# Patient Record
Sex: MALE | Race: BLACK OR AFRICAN AMERICAN | NOT HISPANIC OR LATINO
[De-identification: names, ages, dates, MRNs, and addresses within clinical notes are randomized per-mention and may not be internally consistent; named-entity substitution may affect disease eponyms.]

---

## 2018-01-02 ENCOUNTER — LABORATORY RESULT (OUTPATIENT)
Age: 68
End: 2018-01-02

## 2018-01-04 ENCOUNTER — APPOINTMENT (OUTPATIENT)
Dept: NEPHROLOGY | Facility: CLINIC | Age: 68
End: 2018-01-04
Payer: COMMERCIAL

## 2018-01-04 VITALS — SYSTOLIC BLOOD PRESSURE: 130 MMHG | HEART RATE: 72 BPM | DIASTOLIC BLOOD PRESSURE: 68 MMHG

## 2018-01-04 DIAGNOSIS — Z78.9 OTHER SPECIFIED HEALTH STATUS: ICD-10-CM

## 2018-01-04 PROCEDURE — 99214 OFFICE O/P EST MOD 30 MIN: CPT

## 2018-01-04 RX ORDER — ASPIRIN ENTERIC COATED TABLETS 81 MG 81 MG/1
81 TABLET, DELAYED RELEASE ORAL DAILY
Refills: 0 | Status: ACTIVE | COMMUNITY
Start: 2018-01-04

## 2018-01-04 RX ORDER — LOSARTAN POTASSIUM 100 MG/1
100 TABLET, FILM COATED ORAL DAILY
Qty: 30 | Refills: 5 | Status: ACTIVE | COMMUNITY
Start: 2018-01-04

## 2018-01-04 RX ORDER — ATORVASTATIN CALCIUM 10 MG/1
10 TABLET, FILM COATED ORAL DAILY
Qty: 30 | Refills: 3 | Status: ACTIVE | COMMUNITY
Start: 2018-01-04

## 2018-01-04 RX ORDER — METOPROLOL TARTRATE 25 MG/1
25 TABLET, FILM COATED ORAL TWICE DAILY
Qty: 60 | Refills: 5 | Status: ACTIVE | COMMUNITY
Start: 2018-01-04

## 2018-06-29 LAB
APPEARANCE: CLEAR
BACTERIA: NEGATIVE
BASOPHILS # BLD AUTO: 0.01 K/UL
BASOPHILS NFR BLD AUTO: 0.2 %
BILIRUBIN URINE: NEGATIVE
BLOOD URINE: NEGATIVE
COLOR: YELLOW
EOSINOPHIL # BLD AUTO: 0.07 K/UL
EOSINOPHIL NFR BLD AUTO: 1.2 %
GLUCOSE QUALITATIVE U: NEGATIVE MG/DL
HCT VFR BLD CALC: 40.4 %
HGB BLD-MCNC: 12.6 G/DL
IMM GRANULOCYTES NFR BLD AUTO: 0.2 %
KETONES URINE: NEGATIVE
LEUKOCYTE ESTERASE URINE: NEGATIVE
LYMPHOCYTES # BLD AUTO: 1.69 K/UL
LYMPHOCYTES NFR BLD AUTO: 28.9 %
MAN DIFF?: NORMAL
MCHC RBC-ENTMCNC: 29.5 PG
MCHC RBC-ENTMCNC: 31.2 GM/DL
MCV RBC AUTO: 94.6 FL
MICROSCOPIC-UA: NORMAL
MONOCYTES # BLD AUTO: 0.5 K/UL
MONOCYTES NFR BLD AUTO: 8.5 %
NEUTROPHILS # BLD AUTO: 3.57 K/UL
NEUTROPHILS NFR BLD AUTO: 61 %
NITRITE URINE: NEGATIVE
PH URINE: 5.5
PLATELET # BLD AUTO: 180 K/UL
PROTEIN URINE: ABNORMAL MG/DL
RBC # BLD: 4.27 M/UL
RBC # FLD: 14 %
RED BLOOD CELLS URINE: 3 /HPF
SPECIFIC GRAVITY URINE: 1.02
SQUAMOUS EPITHELIAL CELLS: 0 /HPF
UROBILINOGEN URINE: NEGATIVE MG/DL
WBC # FLD AUTO: 5.85 K/UL
WHITE BLOOD CELLS URINE: 0 /HPF

## 2018-07-01 LAB
25(OH)D3 SERPL-MCNC: 26.1 NG/ML
ALBUMIN SERPL ELPH-MCNC: 4.1 G/DL
ALP BLD-CCNC: 105 U/L
ALT SERPL-CCNC: 23 U/L
ANION GAP SERPL CALC-SCNC: 15 MMOL/L
AST SERPL-CCNC: 20 U/L
BILIRUB SERPL-MCNC: 0.2 MG/DL
BUN SERPL-MCNC: 29 MG/DL
CALCIUM SERPL-MCNC: 9.9 MG/DL
CALCIUM SERPL-MCNC: 9.9 MG/DL
CHLORIDE SERPL-SCNC: 107 MMOL/L
CO2 SERPL-SCNC: 23 MMOL/L
CREAT SERPL-MCNC: 1.58 MG/DL
CREAT SPEC-SCNC: 116 MG/DL
CREAT/PROT UR: 0.1 RATIO
GLUCOSE SERPL-MCNC: 53 MG/DL
MAGNESIUM SERPL-MCNC: 2.1 MG/DL
PARATHYROID HORMONE INTACT: 78 PG/ML
PHOSPHATE SERPL-MCNC: 4.2 MG/DL
POTASSIUM SERPL-SCNC: 4.2 MMOL/L
PROT SERPL-MCNC: 7.2 G/DL
PROT UR-MCNC: 14 MG/DL
SODIUM SERPL-SCNC: 145 MMOL/L
URATE SERPL-MCNC: 5.6 MG/DL

## 2018-07-02 ENCOUNTER — APPOINTMENT (OUTPATIENT)
Dept: NEPHROLOGY | Facility: CLINIC | Age: 68
End: 2018-07-02
Payer: COMMERCIAL

## 2018-07-02 VITALS
DIASTOLIC BLOOD PRESSURE: 78 MMHG | HEART RATE: 60 BPM | SYSTOLIC BLOOD PRESSURE: 146 MMHG | HEIGHT: 66 IN | BODY MASS INDEX: 31.5 KG/M2 | WEIGHT: 196 LBS

## 2018-07-02 PROCEDURE — 99214 OFFICE O/P EST MOD 30 MIN: CPT

## 2018-09-06 ENCOUNTER — APPOINTMENT (OUTPATIENT)
Dept: NEPHROLOGY | Facility: CLINIC | Age: 68
End: 2018-09-06

## 2018-09-14 LAB
ALBUMIN SERPL ELPH-MCNC: 4.2 G/DL
ALP BLD-CCNC: 105 U/L
ALT SERPL-CCNC: 21 U/L
ANION GAP SERPL CALC-SCNC: 13 MMOL/L
AST SERPL-CCNC: 25 U/L
BILIRUB SERPL-MCNC: 0.2 MG/DL
BUN SERPL-MCNC: 36 MG/DL
CALCIUM SERPL-MCNC: 10 MG/DL
CHLORIDE SERPL-SCNC: 102 MMOL/L
CO2 SERPL-SCNC: 26 MMOL/L
CREAT SERPL-MCNC: 1.57 MG/DL
GLUCOSE SERPL-MCNC: 82 MG/DL
MAGNESIUM SERPL-MCNC: 1.9 MG/DL
PHOSPHATE SERPL-MCNC: 3.3 MG/DL
POTASSIUM SERPL-SCNC: 4.2 MMOL/L
PROT SERPL-MCNC: 7.8 G/DL
SODIUM SERPL-SCNC: 141 MMOL/L
URATE SERPL-MCNC: 5.4 MG/DL

## 2018-09-24 ENCOUNTER — APPOINTMENT (OUTPATIENT)
Dept: VASCULAR SURGERY | Facility: CLINIC | Age: 68
End: 2018-09-24
Payer: COMMERCIAL

## 2018-09-24 PROCEDURE — 99213 OFFICE O/P EST LOW 20 MIN: CPT

## 2018-10-04 ENCOUNTER — APPOINTMENT (OUTPATIENT)
Dept: NEPHROLOGY | Facility: CLINIC | Age: 68
End: 2018-10-04
Payer: COMMERCIAL

## 2018-10-04 VITALS
DIASTOLIC BLOOD PRESSURE: 70 MMHG | HEART RATE: 60 BPM | SYSTOLIC BLOOD PRESSURE: 138 MMHG | WEIGHT: 199 LBS | BODY MASS INDEX: 32.12 KG/M2

## 2018-10-04 PROCEDURE — 99214 OFFICE O/P EST MOD 30 MIN: CPT

## 2018-10-04 RX ORDER — SPIRONOLACTONE 25 MG/1
25 TABLET ORAL DAILY
Qty: 90 | Refills: 3 | Status: ACTIVE | COMMUNITY
Start: 2018-10-04 | End: 1900-01-01

## 2018-10-04 RX ORDER — SPIRONOLACTONE 25 MG/1
25 TABLET ORAL DAILY
Qty: 30 | Refills: 5 | Status: DISCONTINUED | COMMUNITY
Start: 2018-01-04 | End: 2018-10-04

## 2018-10-04 RX ORDER — METOLAZONE 2.5 MG/1
2.5 TABLET ORAL DAILY
Qty: 90 | Refills: 3 | Status: ACTIVE | COMMUNITY
Start: 2018-07-02 | End: 1900-01-01

## 2018-10-04 RX ORDER — FUROSEMIDE 40 MG/1
40 TABLET ORAL
Qty: 30 | Refills: 0 | Status: DISCONTINUED | COMMUNITY
Start: 2018-01-04 | End: 2018-10-04

## 2018-10-04 RX ORDER — FUROSEMIDE 40 MG/1
40 TABLET ORAL DAILY
Qty: 90 | Refills: 3 | Status: ACTIVE | COMMUNITY
Start: 2018-10-04 | End: 1900-01-01

## 2018-10-11 ENCOUNTER — APPOINTMENT (OUTPATIENT)
Dept: ENDOCRINOLOGY | Facility: CLINIC | Age: 68
End: 2018-10-11
Payer: COMMERCIAL

## 2018-10-11 VITALS
BODY MASS INDEX: 31.34 KG/M2 | WEIGHT: 195 LBS | HEART RATE: 57 BPM | SYSTOLIC BLOOD PRESSURE: 134 MMHG | HEIGHT: 66 IN | DIASTOLIC BLOOD PRESSURE: 70 MMHG

## 2018-10-11 DIAGNOSIS — E11.9 TYPE 2 DIABETES MELLITUS W/OUT COMPLICATIONS: ICD-10-CM

## 2018-10-11 LAB
GLUCOSE BLDC GLUCOMTR-MCNC: 31
GLUCOSE BLDC GLUCOMTR-MCNC: 78

## 2018-10-11 PROCEDURE — 82962 GLUCOSE BLOOD TEST: CPT

## 2018-10-11 PROCEDURE — 99205 OFFICE O/P NEW HI 60 MIN: CPT | Mod: 25

## 2018-10-11 PROCEDURE — G0447 BEHAVIOR COUNSEL OBESITY 15M: CPT

## 2018-10-11 RX ORDER — INSULIN GLARGINE 100 [IU]/ML
100 INJECTION, SOLUTION SUBCUTANEOUS AT BEDTIME
Qty: 3 | Refills: 3 | Status: ACTIVE | COMMUNITY
Start: 1900-01-01 | End: 1900-01-01

## 2018-10-11 RX ORDER — INSULIN GLARGINE 100 [IU]/ML
100 INJECTION, SOLUTION SUBCUTANEOUS
Refills: 0 | Status: COMPLETED | COMMUNITY
Start: 2018-01-04 | End: 2018-10-11

## 2018-10-11 RX ORDER — LANCETS 33 GAUGE
EACH MISCELLANEOUS
Qty: 3 | Refills: 3 | Status: ACTIVE | COMMUNITY
Start: 2018-10-11 | End: 1900-01-01

## 2018-10-11 RX ORDER — INSULIN ASPART 100 [IU]/ML
100 INJECTION, SOLUTION INTRAVENOUS; SUBCUTANEOUS
Qty: 3 | Refills: 0 | Status: ACTIVE | COMMUNITY
Start: 1900-01-01 | End: 1900-01-01

## 2018-10-11 RX ORDER — BLOOD SUGAR DIAGNOSTIC
STRIP MISCELLANEOUS 3 TIMES DAILY
Qty: 3 | Refills: 3 | Status: ACTIVE | COMMUNITY
Start: 2018-10-11 | End: 1900-01-01

## 2018-10-11 RX ORDER — PEN NEEDLE, DIABETIC 29 G X1/2"
31G X 5 MM NEEDLE, DISPOSABLE MISCELLANEOUS
Qty: 4 | Refills: 3 | Status: ACTIVE | COMMUNITY
Start: 2018-09-18 | End: 1900-01-01

## 2018-12-06 ENCOUNTER — APPOINTMENT (OUTPATIENT)
Dept: ENDOCRINOLOGY | Facility: CLINIC | Age: 68
End: 2018-12-06

## 2019-01-10 ENCOUNTER — APPOINTMENT (OUTPATIENT)
Dept: NEPHROLOGY | Facility: CLINIC | Age: 69
End: 2019-01-10
Payer: COMMERCIAL

## 2019-01-10 VITALS
HEART RATE: 56 BPM | WEIGHT: 199 LBS | SYSTOLIC BLOOD PRESSURE: 136 MMHG | DIASTOLIC BLOOD PRESSURE: 78 MMHG | BODY MASS INDEX: 32.12 KG/M2

## 2019-01-10 DIAGNOSIS — N18.3 CHRONIC KIDNEY DISEASE, STAGE 3 (MODERATE): ICD-10-CM

## 2019-01-10 DIAGNOSIS — I10 ESSENTIAL (PRIMARY) HYPERTENSION: ICD-10-CM

## 2019-01-10 DIAGNOSIS — R60.0 LOCALIZED EDEMA: ICD-10-CM

## 2019-01-10 PROCEDURE — 99214 OFFICE O/P EST MOD 30 MIN: CPT

## 2019-01-10 NOTE — ASSESSMENT
[FreeTextEntry1] : CKD 3 stable fxn\par lytes good \par no proteinuria on ARB and aldactone\par BP acceptable with some improved edema back on lasix and aldactone (with metolazone) \par will cont regimen incl lasix and aldactone with metolazone\par DM control emphasized - f/u PCP \par slt nodualrity and skin darkening rt calf area - ? small hematoma, r/o early cellulitis? -- advised f/u with Dr Healy if doesn’t improve soon\par f/u 4-5 mos \par \par \par \par \par

## 2019-01-10 NOTE — PHYSICAL EXAM
[General Appearance - Alert] : alert [General Appearance - In No Acute Distress] : in no acute distress [Sclera] : the sclera and conjunctiva were normal [Jugular Venous Distention Increased] : there was no jugular-venous distention [Auscultation Breath Sounds / Voice Sounds] : lungs were clear to auscultation bilaterally [Heart Rate And Rhythm] : heart rate was normal and rhythm regular [Heart Sounds Gallop] : no gallops [Murmurs] : no murmurs [Heart Sounds Pericardial Friction Rub] : no pericardial rub [___ +] : bilateral [unfilled]+ pretibial pitting edema [Abdomen Soft] : soft [Abdomen Tenderness] : non-tender [No CVA Tenderness] : no ~M costovertebral angle tenderness [Abnormal Walk] : normal gait [Involuntary Movements] : no involuntary movements were seen [] : no rash [No Focal Deficits] : no focal deficits [Oriented To Time, Place, And Person] : oriented to person, place, and time [Affect] : the affect was normal [FreeTextEntry1] : 2 small areas (1-1.5 cm each)  slt sq  nodularity right posterlateral  calf with darkened skin overlying, slt tender

## 2019-01-10 NOTE — REVIEW OF SYSTEMS
[As noted in HPI] : as noted in HPI [Lower Ext Edema] : lower extremity edema [As Noted in HPI] : as noted in HPI [Negative] : Heme/Lymph [Chest Pain] : no chest pain [FreeTextEntry5] : improved

## 2019-01-10 NOTE — HISTORY OF PRESENT ILLNESS
[FreeTextEntry1] : f/u CKD, HTN, edema\par back on lasix and aldactone since last visit along with metolazone\par constipation hasn’t recurred \par leg swelling  has improved - notes two areas firmness right lower  leg \par BP has been good at doctor visits \par feeling well \par PCP Dr Healy

## 2019-01-11 LAB
25(OH)D3 SERPL-MCNC: 24.8 NG/ML
ALBUMIN SERPL ELPH-MCNC: 4.4 G/DL
ALP BLD-CCNC: 75 U/L
ALT SERPL-CCNC: 24 U/L
ANION GAP SERPL CALC-SCNC: 13 MMOL/L
APPEARANCE: CLEAR
AST SERPL-CCNC: 20 U/L
BACTERIA: NEGATIVE
BASOPHILS # BLD AUTO: 0.01 K/UL
BASOPHILS NFR BLD AUTO: 0.1 %
BILIRUB SERPL-MCNC: 0.2 MG/DL
BILIRUBIN URINE: NEGATIVE
BLOOD URINE: NEGATIVE
BUN SERPL-MCNC: 33 MG/DL
CALCIUM SERPL-MCNC: 10.3 MG/DL
CALCIUM SERPL-MCNC: 10.3 MG/DL
CHLORIDE SERPL-SCNC: 103 MMOL/L
CO2 SERPL-SCNC: 27 MMOL/L
COLOR: YELLOW
CREAT SERPL-MCNC: 1.53 MG/DL
CREAT SPEC-SCNC: 161 MG/DL
CREAT/PROT UR: 0.1 RATIO
EOSINOPHIL # BLD AUTO: 0.03 K/UL
EOSINOPHIL NFR BLD AUTO: 0.4 %
GLUCOSE QUALITATIVE U: NEGATIVE MG/DL
GLUCOSE SERPL-MCNC: 64 MG/DL
HCT VFR BLD CALC: 38.5 %
HGB BLD-MCNC: 12.1 G/DL
IMM GRANULOCYTES NFR BLD AUTO: 0.1 %
KETONES URINE: NEGATIVE
LEUKOCYTE ESTERASE URINE: NEGATIVE
LYMPHOCYTES # BLD AUTO: 1 K/UL
LYMPHOCYTES NFR BLD AUTO: 14.1 %
MAGNESIUM SERPL-MCNC: 2 MG/DL
MAN DIFF?: NORMAL
MCHC RBC-ENTMCNC: 29.9 PG
MCHC RBC-ENTMCNC: 31.4 GM/DL
MCV RBC AUTO: 95.1 FL
MICROSCOPIC-UA: NORMAL
MONOCYTES # BLD AUTO: 0.44 K/UL
MONOCYTES NFR BLD AUTO: 6.2 %
NEUTROPHILS # BLD AUTO: 5.61 K/UL
NEUTROPHILS NFR BLD AUTO: 79.1 %
NITRITE URINE: NEGATIVE
PARATHYROID HORMONE INTACT: 71 PG/ML
PH URINE: 5.5
PHOSPHATE SERPL-MCNC: 4.6 MG/DL
PLATELET # BLD AUTO: 197 K/UL
POTASSIUM SERPL-SCNC: 4.2 MMOL/L
PROT SERPL-MCNC: 7.4 G/DL
PROT UR-MCNC: 20 MG/DL
PROTEIN URINE: NEGATIVE MG/DL
RBC # BLD: 4.05 M/UL
RBC # FLD: 14.1 %
RED BLOOD CELLS URINE: 1 /HPF
SODIUM SERPL-SCNC: 143 MMOL/L
SPECIFIC GRAVITY URINE: 1.02
SQUAMOUS EPITHELIAL CELLS: 0 /HPF
URATE SERPL-MCNC: 6 MG/DL
UROBILINOGEN URINE: NEGATIVE MG/DL
WBC # FLD AUTO: 7.1 K/UL
WHITE BLOOD CELLS URINE: 0 /HPF

## 2019-06-13 ENCOUNTER — APPOINTMENT (OUTPATIENT)
Dept: NEPHROLOGY | Facility: CLINIC | Age: 69
End: 2019-06-13

## 2019-12-08 ENCOUNTER — RX RENEWAL (OUTPATIENT)
Age: 69
End: 2019-12-08

## 2021-11-22 ENCOUNTER — OUTPATIENT (OUTPATIENT)
Dept: OUTPATIENT SERVICES | Facility: HOSPITAL | Age: 71
LOS: 1 days | End: 2021-11-22
Payer: COMMERCIAL

## 2021-11-22 DIAGNOSIS — I50.9 HEART FAILURE, UNSPECIFIED: ICD-10-CM

## 2021-11-22 DIAGNOSIS — I34.0 NONRHEUMATIC MITRAL (VALVE) INSUFFICIENCY: ICD-10-CM

## 2021-11-22 DIAGNOSIS — I42.9 CARDIOMYOPATHY, UNSPECIFIED: ICD-10-CM

## 2021-11-22 PROCEDURE — 78452 HT MUSCLE IMAGE SPECT MULT: CPT | Mod: 26

## 2021-11-22 PROCEDURE — A9500: CPT

## 2021-11-22 PROCEDURE — A9505: CPT

## 2021-11-22 PROCEDURE — 93016 CV STRESS TEST SUPVJ ONLY: CPT

## 2021-11-22 PROCEDURE — 93017 CV STRESS TEST TRACING ONLY: CPT

## 2021-11-22 PROCEDURE — 78452 HT MUSCLE IMAGE SPECT MULT: CPT

## 2021-11-22 PROCEDURE — 93018 CV STRESS TEST I&R ONLY: CPT

## 2022-07-01 ENCOUNTER — APPOINTMENT (OUTPATIENT)
Dept: VASCULAR SURGERY | Facility: CLINIC | Age: 72
End: 2022-07-01

## 2022-07-01 VITALS — SYSTOLIC BLOOD PRESSURE: 125 MMHG | DIASTOLIC BLOOD PRESSURE: 79 MMHG

## 2022-07-01 VITALS — HEIGHT: 66 IN | WEIGHT: 200 LBS | BODY MASS INDEX: 32.14 KG/M2

## 2022-07-01 PROCEDURE — 29580 STRAPPING UNNA BOOT: CPT | Mod: 50

## 2022-07-01 PROCEDURE — 99204 OFFICE O/P NEW MOD 45 MIN: CPT | Mod: 25

## 2022-07-01 RX ORDER — INSULIN GLARGINE 300 U/ML
300 INJECTION, SOLUTION SUBCUTANEOUS
Qty: 9 | Refills: 0 | Status: ACTIVE | COMMUNITY
Start: 2022-06-13

## 2022-07-01 RX ORDER — TAMSULOSIN HYDROCHLORIDE 0.4 MG/1
0.4 CAPSULE ORAL
Qty: 90 | Refills: 0 | Status: ACTIVE | COMMUNITY
Start: 2022-06-13

## 2022-07-01 RX ORDER — AMMONIUM LACTATE 12 %
12 CREAM (GRAM) TOPICAL
Qty: 385 | Refills: 0 | Status: ACTIVE | COMMUNITY
Start: 2021-12-14

## 2022-07-01 RX ORDER — AMLODIPINE BESYLATE 2.5 MG/1
2.5 TABLET ORAL
Qty: 90 | Refills: 0 | Status: ACTIVE | COMMUNITY
Start: 2022-06-13

## 2022-07-01 RX ORDER — ERGOCALCIFEROL 1.25 MG/1
1.25 MG CAPSULE, LIQUID FILLED ORAL
Qty: 2 | Refills: 0 | Status: ACTIVE | COMMUNITY
Start: 2021-12-14

## 2022-07-01 RX ORDER — FINASTERIDE 5 MG/1
5 TABLET, FILM COATED ORAL
Qty: 90 | Refills: 0 | Status: ACTIVE | COMMUNITY
Start: 2022-06-13

## 2022-07-08 ENCOUNTER — APPOINTMENT (OUTPATIENT)
Dept: VASCULAR SURGERY | Facility: CLINIC | Age: 72
End: 2022-07-08

## 2022-07-08 PROCEDURE — 29580 STRAPPING UNNA BOOT: CPT | Mod: 50

## 2022-07-15 ENCOUNTER — APPOINTMENT (OUTPATIENT)
Dept: VASCULAR SURGERY | Facility: CLINIC | Age: 72
End: 2022-07-15

## 2022-07-15 PROCEDURE — 29580 STRAPPING UNNA BOOT: CPT | Mod: 50

## 2022-07-15 RX ORDER — BLOOD-GLUCOSE METER
W/DEVICE KIT MISCELLANEOUS
Qty: 1 | Refills: 0 | Status: ACTIVE | COMMUNITY
Start: 2022-07-06

## 2022-07-22 ENCOUNTER — APPOINTMENT (OUTPATIENT)
Dept: VASCULAR SURGERY | Facility: CLINIC | Age: 72
End: 2022-07-22

## 2022-07-22 PROCEDURE — 29580 STRAPPING UNNA BOOT: CPT | Mod: 50

## 2022-07-29 ENCOUNTER — APPOINTMENT (OUTPATIENT)
Dept: VASCULAR SURGERY | Facility: CLINIC | Age: 72
End: 2022-07-29

## 2022-07-29 DIAGNOSIS — I89.0 LYMPHEDEMA, NOT ELSEWHERE CLASSIFIED: ICD-10-CM

## 2022-07-29 PROCEDURE — 29580 STRAPPING UNNA BOOT: CPT | Mod: 50

## 2022-08-05 ENCOUNTER — APPOINTMENT (OUTPATIENT)
Dept: VASCULAR SURGERY | Facility: CLINIC | Age: 72
End: 2022-08-05

## 2022-08-05 PROCEDURE — 29580 STRAPPING UNNA BOOT: CPT | Mod: 50

## 2022-08-12 ENCOUNTER — APPOINTMENT (OUTPATIENT)
Dept: VASCULAR SURGERY | Facility: CLINIC | Age: 72
End: 2022-08-12

## 2022-08-12 PROCEDURE — 29580 STRAPPING UNNA BOOT: CPT | Mod: 50

## 2022-08-18 ENCOUNTER — OUTPATIENT (OUTPATIENT)
Dept: OUTPATIENT SERVICES | Facility: HOSPITAL | Age: 72
LOS: 1 days | Discharge: HOME | End: 2022-08-18

## 2022-08-18 ENCOUNTER — RESULT REVIEW (OUTPATIENT)
Age: 72
End: 2022-08-18

## 2022-08-18 VITALS
DIASTOLIC BLOOD PRESSURE: 61 MMHG | TEMPERATURE: 97 F | RESPIRATION RATE: 16 BRPM | HEART RATE: 64 BPM | SYSTOLIC BLOOD PRESSURE: 139 MMHG | WEIGHT: 182.98 LBS | OXYGEN SATURATION: 100 % | HEIGHT: 66 IN

## 2022-08-18 DIAGNOSIS — I89.0 LYMPHEDEMA, NOT ELSEWHERE CLASSIFIED: ICD-10-CM

## 2022-08-18 DIAGNOSIS — Z98.890 OTHER SPECIFIED POSTPROCEDURAL STATES: Chronic | ICD-10-CM

## 2022-08-18 DIAGNOSIS — Z01.818 ENCOUNTER FOR OTHER PREPROCEDURAL EXAMINATION: ICD-10-CM

## 2022-08-18 LAB
A1C WITH ESTIMATED AVERAGE GLUCOSE RESULT: 8.9 % — HIGH (ref 4–5.6)
ALBUMIN SERPL ELPH-MCNC: 4.3 G/DL — SIGNIFICANT CHANGE UP (ref 3.5–5.2)
ALP SERPL-CCNC: 152 U/L — HIGH (ref 30–115)
ALT FLD-CCNC: 20 U/L — SIGNIFICANT CHANGE UP (ref 0–41)
ANION GAP SERPL CALC-SCNC: 13 MMOL/L — SIGNIFICANT CHANGE UP (ref 7–14)
APTT BLD: 31.2 SEC — SIGNIFICANT CHANGE UP (ref 27–39.2)
AST SERPL-CCNC: 23 U/L — SIGNIFICANT CHANGE UP (ref 0–41)
BASOPHILS # BLD AUTO: 0.03 K/UL — SIGNIFICANT CHANGE UP (ref 0–0.2)
BASOPHILS NFR BLD AUTO: 0.5 % — SIGNIFICANT CHANGE UP (ref 0–1)
BILIRUB SERPL-MCNC: 0.2 MG/DL — SIGNIFICANT CHANGE UP (ref 0.2–1.2)
BUN SERPL-MCNC: 45 MG/DL — HIGH (ref 10–20)
CALCIUM SERPL-MCNC: 9.4 MG/DL — SIGNIFICANT CHANGE UP (ref 8.5–10.1)
CHLORIDE SERPL-SCNC: 97 MMOL/L — LOW (ref 98–110)
CO2 SERPL-SCNC: 28 MMOL/L — SIGNIFICANT CHANGE UP (ref 17–32)
CREAT SERPL-MCNC: 1.7 MG/DL — HIGH (ref 0.7–1.5)
EGFR: 42 ML/MIN/1.73M2 — LOW
EOSINOPHIL # BLD AUTO: 0.15 K/UL — SIGNIFICANT CHANGE UP (ref 0–0.7)
EOSINOPHIL NFR BLD AUTO: 2.4 % — SIGNIFICANT CHANGE UP (ref 0–8)
ESTIMATED AVERAGE GLUCOSE: 209 MG/DL — HIGH (ref 68–114)
GLUCOSE SERPL-MCNC: 217 MG/DL — HIGH (ref 70–99)
HCT VFR BLD CALC: 33.3 % — LOW (ref 42–52)
HGB BLD-MCNC: 10.6 G/DL — LOW (ref 14–18)
IMM GRANULOCYTES NFR BLD AUTO: 0.3 % — SIGNIFICANT CHANGE UP (ref 0.1–0.3)
INR BLD: 1.03 RATIO — SIGNIFICANT CHANGE UP (ref 0.65–1.3)
LYMPHOCYTES # BLD AUTO: 1.97 K/UL — SIGNIFICANT CHANGE UP (ref 1.2–3.4)
LYMPHOCYTES # BLD AUTO: 30.9 % — SIGNIFICANT CHANGE UP (ref 20.5–51.1)
MCHC RBC-ENTMCNC: 29.2 PG — SIGNIFICANT CHANGE UP (ref 27–31)
MCHC RBC-ENTMCNC: 31.8 G/DL — LOW (ref 32–37)
MCV RBC AUTO: 91.7 FL — SIGNIFICANT CHANGE UP (ref 80–94)
MONOCYTES # BLD AUTO: 0.5 K/UL — SIGNIFICANT CHANGE UP (ref 0.1–0.6)
MONOCYTES NFR BLD AUTO: 7.8 % — SIGNIFICANT CHANGE UP (ref 1.7–9.3)
NEUTROPHILS # BLD AUTO: 3.71 K/UL — SIGNIFICANT CHANGE UP (ref 1.4–6.5)
NEUTROPHILS NFR BLD AUTO: 58.1 % — SIGNIFICANT CHANGE UP (ref 42.2–75.2)
NRBC # BLD: 0 /100 WBCS — SIGNIFICANT CHANGE UP (ref 0–0)
PLATELET # BLD AUTO: 173 K/UL — SIGNIFICANT CHANGE UP (ref 130–400)
POTASSIUM SERPL-MCNC: 3.9 MMOL/L — SIGNIFICANT CHANGE UP (ref 3.5–5)
POTASSIUM SERPL-SCNC: 3.9 MMOL/L — SIGNIFICANT CHANGE UP (ref 3.5–5)
PROT SERPL-MCNC: 7.7 G/DL — SIGNIFICANT CHANGE UP (ref 6–8)
PROTHROM AB SERPL-ACNC: 11.8 SEC — SIGNIFICANT CHANGE UP (ref 9.95–12.87)
RBC # BLD: 3.63 M/UL — LOW (ref 4.7–6.1)
RBC # FLD: 15.4 % — HIGH (ref 11.5–14.5)
SODIUM SERPL-SCNC: 138 MMOL/L — SIGNIFICANT CHANGE UP (ref 135–146)
WBC # BLD: 6.38 K/UL — SIGNIFICANT CHANGE UP (ref 4.8–10.8)
WBC # FLD AUTO: 6.38 K/UL — SIGNIFICANT CHANGE UP (ref 4.8–10.8)

## 2022-08-18 PROCEDURE — 93010 ELECTROCARDIOGRAM REPORT: CPT

## 2022-08-18 PROCEDURE — 71046 X-RAY EXAM CHEST 2 VIEWS: CPT | Mod: 26

## 2022-08-18 NOTE — H&P PST ADULT - REASON FOR ADMISSION
73 yo male presents for PAST in preparation for radical debulking of left leg lymphatic mass on 9/8/2022 under general anesthesia by Dr. Evangelista (OR [place]).

## 2022-08-18 NOTE — H&P PST ADULT - NSICDXPASTMEDICALHX_GEN_ALL_CORE_FT
PAST MEDICAL HISTORY:  DM (diabetes mellitus)     Glaucoma     High cholesterol     HTN (hypertension)     Stage 3 chronic kidney disease

## 2022-08-18 NOTE — H&P PST ADULT - NSANTHTIREDRD_ENT_A_CORE
Yessenia Reis Patient Age: 70 year old  MESSAGE:   Patient wife calling in regards to scheduling surgery. Wife states that Dr. Bonner had recommended cataract surgery for the patient when he was seen on 12/26/19. Wife is requesting to schedule for cataract surgery for patient. Surgery scheduler was unavailable at time of call. Please advise.      WEIGHT AND HEIGHT:   Wt Readings from Last 1 Encounters:   08/11/20 112.5 kg (248 lb)     Ht Readings from Last 1 Encounters:   08/11/20 5' 6\" (1.676 m)     BMI Readings from Last 1 Encounters:   08/11/20 40.03 kg/m²       ALLERGIES: no known allergies.  Current Outpatient Medications   Medication   • metFORMIN (GLUCOPHAGE) 500 MG tablet   • gemfibrozil (LOPID) 600 MG tablet   • lisinopril (ZESTRIL) 40 MG tablet   • doxazosin (CARDURA) 8 MG tablet   • metoPROLOL succinate (TOPROL-XL) 100 MG 24 hr tablet   • amLODIPine (NORVASC) 10 MG tablet   • atorvastatin (LIPITOR) 20 MG tablet   • blood glucose test strip   • Blood Glucose Monitoring Suppl (ONE TOUCH ULTRA 2) w/Device Kit   • SOFTCLIX LANCETS Misc   • blood glucose (ACCU-CHEK KOSTAS PLUS) test strip   • Lancets (ONETOUCH ULTRASOFT) Misc   • Blood Glucose Monitoring Suppl (ACCU-CHEK KOSTAS) Device   • aspirin 81 MG chewable tablet     No current facility-administered medications for this visit.      PHARMACY to use:           Pharmacy preference(s) on file:   [id number] - [place], [place] - [address] [facility]  [address] [place] [id number]  Phone: [phone] Fax: [phone]      CALL BACK INFO: Ok to leave response (including medical information) with family member or on answering machine  ROUTING: Patient's physician/staff        PCP: Danita Mendez MD         INS: Payor: [employer]ICARE / Plan: [id number] / Product Type: MEDICARE   PATIENT ADDRESS:  [address] [place] [address] [address] [place] [address]  
Appointment scheduled for [facility] and surgery.  Surgery worksheet needs to be filled out at [facility].  Very bad connection over the phone  
Left message for patient to call back to schedule surgery    Transfer to Makayla   
Ok to schedule or do you need to see again first?  
Patients wife calling back in regards to below message.     Makayla not available at time of call      Wife only speak Bangladeshi   
Routed to surgery pool  
ok to sched.  
No

## 2022-08-18 NOTE — H&P PST ADULT - HISTORY OF PRESENT ILLNESS
Pt states for over 3 years he noticed swelling in both legs that started at the ankles and progressively began moving up towards the knees. Pt states swelling is worse when he consumes a lot of water. Tried medications and other conservative methods without success to relieve swelling. Now for listed procedure.    Denies any chest pain, difficulty breathing, SOB, palpitations, dysuria, URI, or any other infections in the last 2 weeks. Denies any recent travel, contact, or exposure to any persons with known or suspected COVID-19. Pt also denies COVID testing within the last 2 weeks. Pt admits to receiving all doses of Pfizer COVID vaccine. Denies any suicidal or homicidal ideations. Pt advised to self quarantine until day of procedure. Exercise tolerance of 2 flights of stairs without dyspnea. ISIDRO reviewed with patient. Pt verbalized understanding of all pre-operative instructions.    Anesthesia Alert  YES--Difficult Airway: Class IV  NO--History of neck surgery or radiation  NO--Limited ROM of neck  NO--History of Malignant hyperthermia  NO--No personal or family history of Pseudocholinesterase deficiency.  NO--Prior Anesthesia Complication  NO--Latex Allergy  NO--Loose teeth  NO--History of Rheumatoid Arthritis  NO--ISIDRO  NO--Bleeding Risk  NO--Other_____

## 2022-08-18 NOTE — H&P PST ADULT - DOES THIS PATIENT HAVE A HISTORY OF OR HAS BEEN DX WITH HEART FAILURE?
Pt is a 89 y/o M w/ pmhx of HTN, DM, BPH presenting after fall today. Pt admitted for sepsis 2/2 to possible UTI and PT eval. unknown

## 2022-08-18 NOTE — H&P PST ADULT - MUSCULOSKELETAL
normal/ROM intact/normal gait/strength 5/5 bilateral upper extremities/strength 5/5 bilateral lower extremities/extremities exam

## 2022-08-19 ENCOUNTER — APPOINTMENT (OUTPATIENT)
Dept: VASCULAR SURGERY | Facility: CLINIC | Age: 72
End: 2022-08-19

## 2022-08-19 PROBLEM — E78.00 PURE HYPERCHOLESTEROLEMIA, UNSPECIFIED: Chronic | Status: ACTIVE | Noted: 2022-08-18

## 2022-08-19 PROBLEM — E11.9 TYPE 2 DIABETES MELLITUS WITHOUT COMPLICATIONS: Chronic | Status: ACTIVE | Noted: 2022-08-18

## 2022-08-19 PROBLEM — I10 ESSENTIAL (PRIMARY) HYPERTENSION: Chronic | Status: ACTIVE | Noted: 2022-08-18

## 2022-08-19 PROBLEM — H40.9 UNSPECIFIED GLAUCOMA: Chronic | Status: ACTIVE | Noted: 2022-08-18

## 2022-08-19 PROBLEM — N18.30 CHRONIC KIDNEY DISEASE, STAGE 3 UNSPECIFIED: Chronic | Status: ACTIVE | Noted: 2022-08-18

## 2022-08-19 PROCEDURE — 29580 STRAPPING UNNA BOOT: CPT | Mod: 50

## 2022-08-26 ENCOUNTER — APPOINTMENT (OUTPATIENT)
Dept: VASCULAR SURGERY | Facility: CLINIC | Age: 72
End: 2022-08-26

## 2022-08-26 PROCEDURE — 29580 STRAPPING UNNA BOOT: CPT | Mod: 50

## 2022-09-02 ENCOUNTER — APPOINTMENT (OUTPATIENT)
Dept: VASCULAR SURGERY | Facility: CLINIC | Age: 72
End: 2022-09-02

## 2022-09-02 PROCEDURE — 29580 STRAPPING UNNA BOOT: CPT | Mod: 50

## 2022-09-05 ENCOUNTER — LABORATORY RESULT (OUTPATIENT)
Age: 72
End: 2022-09-05

## 2022-09-08 ENCOUNTER — TRANSCRIPTION ENCOUNTER (OUTPATIENT)
Age: 72
End: 2022-09-08

## 2022-09-08 ENCOUNTER — APPOINTMENT (OUTPATIENT)
Dept: VASCULAR SURGERY | Facility: HOSPITAL | Age: 72
End: 2022-09-08

## 2022-09-08 ENCOUNTER — INPATIENT (INPATIENT)
Facility: HOSPITAL | Age: 72
LOS: 5 days | Discharge: HOME HEALTH | End: 2022-09-14
Attending: SURGERY | Admitting: SURGERY

## 2022-09-08 VITALS
DIASTOLIC BLOOD PRESSURE: 58 MMHG | TEMPERATURE: 99 F | SYSTOLIC BLOOD PRESSURE: 123 MMHG | WEIGHT: 177.91 LBS | HEIGHT: 66 IN | RESPIRATION RATE: 18 BRPM | OXYGEN SATURATION: 100 % | HEART RATE: 68 BPM

## 2022-09-08 DIAGNOSIS — I89.0 LYMPHEDEMA, NOT ELSEWHERE CLASSIFIED: ICD-10-CM

## 2022-09-08 DIAGNOSIS — L97.929 NON-PRESSURE CHRONIC ULCER OF UNSPECIFIED PART OF LEFT LOWER LEG WITH UNSPECIFIED SEVERITY: ICD-10-CM

## 2022-09-08 DIAGNOSIS — R33.9 RETENTION OF URINE, UNSPECIFIED: ICD-10-CM

## 2022-09-08 DIAGNOSIS — Z98.890 OTHER SPECIFIED POSTPROCEDURAL STATES: Chronic | ICD-10-CM

## 2022-09-08 DIAGNOSIS — Z79.82 LONG TERM (CURRENT) USE OF ASPIRIN: ICD-10-CM

## 2022-09-08 DIAGNOSIS — I96 GANGRENE, NOT ELSEWHERE CLASSIFIED: ICD-10-CM

## 2022-09-08 DIAGNOSIS — E78.00 PURE HYPERCHOLESTEROLEMIA, UNSPECIFIED: ICD-10-CM

## 2022-09-08 DIAGNOSIS — E11.22 TYPE 2 DIABETES MELLITUS WITH DIABETIC CHRONIC KIDNEY DISEASE: ICD-10-CM

## 2022-09-08 DIAGNOSIS — E11.622 TYPE 2 DIABETES MELLITUS WITH OTHER SKIN ULCER: ICD-10-CM

## 2022-09-08 DIAGNOSIS — H40.9 UNSPECIFIED GLAUCOMA: ICD-10-CM

## 2022-09-08 DIAGNOSIS — Z59.00 HOMELESSNESS UNSPECIFIED: ICD-10-CM

## 2022-09-08 DIAGNOSIS — E11.65 TYPE 2 DIABETES MELLITUS WITH HYPERGLYCEMIA: ICD-10-CM

## 2022-09-08 DIAGNOSIS — N18.30 CHRONIC KIDNEY DISEASE, STAGE 3 UNSPECIFIED: ICD-10-CM

## 2022-09-08 DIAGNOSIS — Z79.899 OTHER LONG TERM (CURRENT) DRUG THERAPY: ICD-10-CM

## 2022-09-08 DIAGNOSIS — Z79.4 LONG TERM (CURRENT) USE OF INSULIN: ICD-10-CM

## 2022-09-08 DIAGNOSIS — I12.9 HYPERTENSIVE CHRONIC KIDNEY DISEASE WITH STAGE 1 THROUGH STAGE 4 CHRONIC KIDNEY DISEASE, OR UNSPECIFIED CHRONIC KIDNEY DISEASE: ICD-10-CM

## 2022-09-08 LAB
GLUCOSE BLDC GLUCOMTR-MCNC: 254 MG/DL — HIGH (ref 70–99)
GLUCOSE BLDC GLUCOMTR-MCNC: 278 MG/DL — HIGH (ref 70–99)

## 2022-09-08 PROCEDURE — 27616 RESECT LEG/ANKLE TUM 5 CM/>: CPT

## 2022-09-08 RX ORDER — METOPROLOL TARTRATE 50 MG
1 TABLET ORAL
Qty: 0 | Refills: 0 | DISCHARGE

## 2022-09-08 RX ORDER — ASPIRIN/CALCIUM CARB/MAGNESIUM 324 MG
1 TABLET ORAL
Qty: 0 | Refills: 0 | DISCHARGE

## 2022-09-08 RX ORDER — LATANOPROST 0.05 MG/ML
1 SOLUTION/ DROPS OPHTHALMIC; TOPICAL
Qty: 0 | Refills: 0 | DISCHARGE

## 2022-09-08 RX ORDER — SODIUM CHLORIDE 9 MG/ML
1000 INJECTION, SOLUTION INTRAVENOUS
Refills: 0 | Status: DISCONTINUED | OUTPATIENT
Start: 2022-09-08 | End: 2022-09-14

## 2022-09-08 RX ORDER — INSULIN GLARGINE 100 [IU]/ML
33 INJECTION, SOLUTION SUBCUTANEOUS
Qty: 0 | Refills: 0 | DISCHARGE

## 2022-09-08 RX ORDER — LOSARTAN POTASSIUM 100 MG/1
1 TABLET, FILM COATED ORAL
Qty: 0 | Refills: 0 | DISCHARGE

## 2022-09-08 RX ORDER — GLUCAGON INJECTION, SOLUTION 0.5 MG/.1ML
1 INJECTION, SOLUTION SUBCUTANEOUS ONCE
Refills: 0 | Status: DISCONTINUED | OUTPATIENT
Start: 2022-09-08 | End: 2022-09-14

## 2022-09-08 RX ORDER — ACETAMINOPHEN 500 MG
650 TABLET ORAL EVERY 6 HOURS
Refills: 0 | Status: DISCONTINUED | OUTPATIENT
Start: 2022-09-08 | End: 2022-09-14

## 2022-09-08 RX ORDER — ATORVASTATIN CALCIUM 80 MG/1
1 TABLET, FILM COATED ORAL
Qty: 0 | Refills: 0 | DISCHARGE

## 2022-09-08 RX ORDER — FINASTERIDE 5 MG/1
1 TABLET, FILM COATED ORAL
Qty: 0 | Refills: 0 | DISCHARGE

## 2022-09-08 RX ORDER — INSULIN LISPRO 100/ML
VIAL (ML) SUBCUTANEOUS
Refills: 0 | Status: DISCONTINUED | OUTPATIENT
Start: 2022-09-08 | End: 2022-09-10

## 2022-09-08 RX ORDER — INSULIN GLARGINE 100 [IU]/ML
33 INJECTION, SOLUTION SUBCUTANEOUS AT BEDTIME
Refills: 0 | Status: DISCONTINUED | OUTPATIENT
Start: 2022-09-08 | End: 2022-09-14

## 2022-09-08 RX ORDER — ASPIRIN/CALCIUM CARB/MAGNESIUM 324 MG
81 TABLET ORAL DAILY
Refills: 0 | Status: DISCONTINUED | OUTPATIENT
Start: 2022-09-08 | End: 2022-09-14

## 2022-09-08 RX ORDER — FERROUS SULFATE 325(65) MG
1 TABLET ORAL
Qty: 0 | Refills: 0 | DISCHARGE

## 2022-09-08 RX ORDER — DEXTROSE 50 % IN WATER 50 %
25 SYRINGE (ML) INTRAVENOUS ONCE
Refills: 0 | Status: DISCONTINUED | OUTPATIENT
Start: 2022-09-08 | End: 2022-09-14

## 2022-09-08 RX ORDER — DEXTROSE 50 % IN WATER 50 %
12.5 SYRINGE (ML) INTRAVENOUS ONCE
Refills: 0 | Status: DISCONTINUED | OUTPATIENT
Start: 2022-09-08 | End: 2022-09-14

## 2022-09-08 RX ORDER — HYDROMORPHONE HYDROCHLORIDE 2 MG/ML
0.5 INJECTION INTRAMUSCULAR; INTRAVENOUS; SUBCUTANEOUS
Refills: 0 | Status: DISCONTINUED | OUTPATIENT
Start: 2022-09-08 | End: 2022-09-08

## 2022-09-08 RX ORDER — DEXTROSE 50 % IN WATER 50 %
15 SYRINGE (ML) INTRAVENOUS ONCE
Refills: 0 | Status: DISCONTINUED | OUTPATIENT
Start: 2022-09-08 | End: 2022-09-14

## 2022-09-08 RX ORDER — ONDANSETRON 8 MG/1
4 TABLET, FILM COATED ORAL ONCE
Refills: 0 | Status: DISCONTINUED | OUTPATIENT
Start: 2022-09-08 | End: 2022-09-08

## 2022-09-08 RX ORDER — FUROSEMIDE 40 MG
40 TABLET ORAL DAILY
Refills: 0 | Status: DISCONTINUED | OUTPATIENT
Start: 2022-09-08 | End: 2022-09-14

## 2022-09-08 RX ORDER — SODIUM CHLORIDE 9 MG/ML
1000 INJECTION, SOLUTION INTRAVENOUS
Refills: 0 | Status: DISCONTINUED | OUTPATIENT
Start: 2022-09-08 | End: 2022-09-08

## 2022-09-08 RX ORDER — METOPROLOL TARTRATE 50 MG
25 TABLET ORAL
Refills: 0 | Status: DISCONTINUED | OUTPATIENT
Start: 2022-09-08 | End: 2022-09-14

## 2022-09-08 RX ORDER — TAMSULOSIN HYDROCHLORIDE 0.4 MG/1
0.4 CAPSULE ORAL AT BEDTIME
Refills: 0 | Status: DISCONTINUED | OUTPATIENT
Start: 2022-09-08 | End: 2022-09-14

## 2022-09-08 RX ORDER — AMLODIPINE BESYLATE 2.5 MG/1
1 TABLET ORAL
Qty: 0 | Refills: 0 | DISCHARGE

## 2022-09-08 RX ORDER — TAMSULOSIN HYDROCHLORIDE 0.4 MG/1
1 CAPSULE ORAL
Qty: 0 | Refills: 0 | DISCHARGE

## 2022-09-08 RX ORDER — CICLOPIROX OLAMINE 7.7 MG/G
1 CREAM TOPICAL
Qty: 0 | Refills: 0 | DISCHARGE

## 2022-09-08 RX ORDER — LOSARTAN POTASSIUM 100 MG/1
100 TABLET, FILM COATED ORAL DAILY
Refills: 0 | Status: DISCONTINUED | OUTPATIENT
Start: 2022-09-08 | End: 2022-09-14

## 2022-09-08 RX ORDER — INSULIN ASPART 100 [IU]/ML
0 INJECTION, SOLUTION SUBCUTANEOUS
Qty: 0 | Refills: 0 | DISCHARGE

## 2022-09-08 RX ORDER — ATORVASTATIN CALCIUM 80 MG/1
10 TABLET, FILM COATED ORAL DAILY
Refills: 0 | Status: DISCONTINUED | OUTPATIENT
Start: 2022-09-08 | End: 2022-09-14

## 2022-09-08 RX ORDER — FUROSEMIDE 40 MG
1 TABLET ORAL
Qty: 0 | Refills: 0 | DISCHARGE

## 2022-09-08 RX ORDER — AMLODIPINE BESYLATE 2.5 MG/1
2.5 TABLET ORAL DAILY
Refills: 0 | Status: DISCONTINUED | OUTPATIENT
Start: 2022-09-08 | End: 2022-09-14

## 2022-09-08 RX ORDER — FINASTERIDE 5 MG/1
5 TABLET, FILM COATED ORAL DAILY
Refills: 0 | Status: DISCONTINUED | OUTPATIENT
Start: 2022-09-08 | End: 2022-09-14

## 2022-09-08 RX ADMIN — TAMSULOSIN HYDROCHLORIDE 0.4 MILLIGRAM(S): 0.4 CAPSULE ORAL at 22:33

## 2022-09-08 RX ADMIN — INSULIN GLARGINE 33 UNIT(S): 100 INJECTION, SOLUTION SUBCUTANEOUS at 22:33

## 2022-09-08 SDOH — ECONOMIC STABILITY - HOUSING INSECURITY: HOMELESSNESS UNSPECIFIED: Z59.00

## 2022-09-08 NOTE — PATIENT PROFILE ADULT - FALL HARM RISK - HARM RISK INTERVENTIONS
Assistance with ambulation/Assistance OOB with selected safe patient handling equipment/Communicate Risk of Fall with Harm to all staff/Discuss with provider need for PT consult/Monitor gait and stability/Reinforce activity limits and safety measures with patient and family/Tailored Fall Risk Interventions/Visual Cue: Yellow wristband and red socks/Bed in lowest position, wheels locked, appropriate side rails in place/Call bell, personal items and telephone in reach/Instruct patient to call for assistance before getting out of bed or chair/Non-slip footwear when patient is out of bed/[place] to call system/Physically safe environment - no spills, clutter or unnecessary equipment/Purposeful Proactive Rounding/Room/bathroom lighting operational, light cord in reach

## 2022-09-08 NOTE — ASU PATIENT PROFILE, ADULT - FALL HARM RISK - UNIVERSAL INTERVENTIONS
Bed in lowest position, wheels locked, appropriate side rails in place/Call bell, personal items and telephone in reach/Instruct patient to call for assistance before getting out of bed or chair/Non-slip footwear when patient is out of bed/[place] to call system/Physically safe environment - no spills, clutter or unnecessary equipment/Purposeful Proactive Rounding/Room/bathroom lighting operational, light cord in reach

## 2022-09-08 NOTE — PRE-ANESTHESIA EVALUATION ADULT - NSANTHTOTALSCORECAL_ENT_A_CORE
----- Message from Gill Pearce sent at 2019  4:00 PM CST -----  Contact: aziza Mercado  MRN: [id number]  : 1937  PCP: Dylan Adam  Home Phone      [phone]  Work Phone      Not on file.  Mobile          [phone]      MESSAGE:    Aziza needs to talk to the nurse about his pre-op lab work. Also, the pt needed blood recently. In 2016  recommended the pt having a total GI work up, they do not think that ever happened. Does he still feel that the pt needs this?    [phone]  
Angiogram scheduled for 02/19/19, they like to do a transient aortic valve replacement as well. Before valve replacement pt needs a GI work up to find out where the bleeding is coming form    Will discuss referral during pts appt tomorrow.      
3

## 2022-09-08 NOTE — BRIEF OPERATIVE NOTE - OPERATION/FINDINGS
preop; left lower extremity lymphatic mass   Operation; excisional debridement of left lower extremity

## 2022-09-09 ENCOUNTER — TRANSCRIPTION ENCOUNTER (OUTPATIENT)
Age: 72
End: 2022-09-09

## 2022-09-09 LAB
ANION GAP SERPL CALC-SCNC: 11 MMOL/L — SIGNIFICANT CHANGE UP (ref 7–14)
BASOPHILS # BLD AUTO: 0 K/UL — SIGNIFICANT CHANGE UP (ref 0–0.2)
BASOPHILS NFR BLD AUTO: 0 % — SIGNIFICANT CHANGE UP (ref 0–1)
BUN SERPL-MCNC: 50 MG/DL — HIGH (ref 10–20)
CALCIUM SERPL-MCNC: 8.9 MG/DL — SIGNIFICANT CHANGE UP (ref 8.5–10.1)
CHLORIDE SERPL-SCNC: 103 MMOL/L — SIGNIFICANT CHANGE UP (ref 98–110)
CO2 SERPL-SCNC: 26 MMOL/L — SIGNIFICANT CHANGE UP (ref 17–32)
CREAT SERPL-MCNC: 1.7 MG/DL — HIGH (ref 0.7–1.5)
EGFR: 42 ML/MIN/1.73M2 — LOW
EOSINOPHIL # BLD AUTO: 0 K/UL — SIGNIFICANT CHANGE UP (ref 0–0.7)
EOSINOPHIL NFR BLD AUTO: 0 % — SIGNIFICANT CHANGE UP (ref 0–8)
GLUCOSE BLDC GLUCOMTR-MCNC: 141 MG/DL — HIGH (ref 70–99)
GLUCOSE BLDC GLUCOMTR-MCNC: 310 MG/DL — HIGH (ref 70–99)
GLUCOSE BLDC GLUCOMTR-MCNC: 341 MG/DL — HIGH (ref 70–99)
GLUCOSE BLDC GLUCOMTR-MCNC: 356 MG/DL — HIGH (ref 70–99)
GLUCOSE BLDC GLUCOMTR-MCNC: 380 MG/DL — HIGH (ref 70–99)
GLUCOSE BLDC GLUCOMTR-MCNC: 458 MG/DL — CRITICAL HIGH (ref 70–99)
GLUCOSE SERPL-MCNC: 305 MG/DL — HIGH (ref 70–99)
HCT VFR BLD CALC: 31.4 % — LOW (ref 42–52)
HGB BLD-MCNC: 10.7 G/DL — LOW (ref 14–18)
IMM GRANULOCYTES NFR BLD AUTO: 0.2 % — SIGNIFICANT CHANGE UP (ref 0.1–0.3)
LYMPHOCYTES # BLD AUTO: 0.65 K/UL — LOW (ref 1.2–3.4)
LYMPHOCYTES # BLD AUTO: 8.1 % — LOW (ref 20.5–51.1)
MAGNESIUM SERPL-MCNC: 2 MG/DL — SIGNIFICANT CHANGE UP (ref 1.8–2.4)
MCHC RBC-ENTMCNC: 29.6 PG — SIGNIFICANT CHANGE UP (ref 27–31)
MCHC RBC-ENTMCNC: 34.1 G/DL — SIGNIFICANT CHANGE UP (ref 32–37)
MCV RBC AUTO: 87 FL — SIGNIFICANT CHANGE UP (ref 80–94)
MONOCYTES # BLD AUTO: 0.45 K/UL — SIGNIFICANT CHANGE UP (ref 0.1–0.6)
MONOCYTES NFR BLD AUTO: 5.6 % — SIGNIFICANT CHANGE UP (ref 1.7–9.3)
NEUTROPHILS # BLD AUTO: 6.9 K/UL — HIGH (ref 1.4–6.5)
NEUTROPHILS NFR BLD AUTO: 86.1 % — HIGH (ref 42.2–75.2)
NRBC # BLD: 0 /100 WBCS — SIGNIFICANT CHANGE UP (ref 0–0)
PHOSPHATE SERPL-MCNC: 5.6 MG/DL — HIGH (ref 2.1–4.9)
PLATELET # BLD AUTO: 150 K/UL — SIGNIFICANT CHANGE UP (ref 130–400)
POTASSIUM SERPL-MCNC: 4.6 MMOL/L — SIGNIFICANT CHANGE UP (ref 3.5–5)
POTASSIUM SERPL-SCNC: 4.6 MMOL/L — SIGNIFICANT CHANGE UP (ref 3.5–5)
RBC # BLD: 3.61 M/UL — LOW (ref 4.7–6.1)
RBC # FLD: 16.2 % — HIGH (ref 11.5–14.5)
SODIUM SERPL-SCNC: 140 MMOL/L — SIGNIFICANT CHANGE UP (ref 135–146)
WBC # BLD: 8.02 K/UL — SIGNIFICANT CHANGE UP (ref 4.8–10.8)
WBC # FLD AUTO: 8.02 K/UL — SIGNIFICANT CHANGE UP (ref 4.8–10.8)

## 2022-09-09 RX ORDER — INSULIN LISPRO 100/ML
6 VIAL (ML) SUBCUTANEOUS ONCE
Refills: 0 | Status: DISCONTINUED | OUTPATIENT
Start: 2022-09-09 | End: 2022-09-09

## 2022-09-09 RX ORDER — HEPARIN SODIUM 5000 [USP'U]/ML
5000 INJECTION INTRAVENOUS; SUBCUTANEOUS EVERY 8 HOURS
Refills: 0 | Status: DISCONTINUED | OUTPATIENT
Start: 2022-09-09 | End: 2022-09-14

## 2022-09-09 RX ADMIN — Medication 650 MILLIGRAM(S): at 12:16

## 2022-09-09 RX ADMIN — INSULIN GLARGINE 33 UNIT(S): 100 INJECTION, SOLUTION SUBCUTANEOUS at 21:56

## 2022-09-09 RX ADMIN — Medication 650 MILLIGRAM(S): at 00:30

## 2022-09-09 RX ADMIN — TAMSULOSIN HYDROCHLORIDE 0.4 MILLIGRAM(S): 0.4 CAPSULE ORAL at 21:56

## 2022-09-09 RX ADMIN — FINASTERIDE 5 MILLIGRAM(S): 5 TABLET, FILM COATED ORAL at 12:17

## 2022-09-09 RX ADMIN — Medication 650 MILLIGRAM(S): at 05:57

## 2022-09-09 RX ADMIN — HEPARIN SODIUM 5000 UNIT(S): 5000 INJECTION INTRAVENOUS; SUBCUTANEOUS at 16:59

## 2022-09-09 RX ADMIN — ATORVASTATIN CALCIUM 10 MILLIGRAM(S): 80 TABLET, FILM COATED ORAL at 12:17

## 2022-09-09 RX ADMIN — LOSARTAN POTASSIUM 100 MILLIGRAM(S): 100 TABLET, FILM COATED ORAL at 11:23

## 2022-09-09 RX ADMIN — Medication 81 MILLIGRAM(S): at 12:16

## 2022-09-09 RX ADMIN — Medication 10: at 17:01

## 2022-09-09 RX ADMIN — Medication 650 MILLIGRAM(S): at 17:06

## 2022-09-09 RX ADMIN — Medication 10: at 08:01

## 2022-09-09 RX ADMIN — Medication 12: at 12:14

## 2022-09-09 RX ADMIN — Medication 25 MILLIGRAM(S): at 05:58

## 2022-09-09 RX ADMIN — Medication 40 MILLIGRAM(S): at 05:59

## 2022-09-09 RX ADMIN — AMLODIPINE BESYLATE 2.5 MILLIGRAM(S): 2.5 TABLET ORAL at 05:58

## 2022-09-09 RX ADMIN — Medication 25 MILLIGRAM(S): at 17:06

## 2022-09-09 NOTE — DISCHARGE NOTE NURSING/CASE MANAGEMENT/SOCIAL WORK - PATIENT PORTAL LINK FT
You can access the FollowMyHealth Patient Portal offered by [facility] by registering at the following website: [email or web address]. By joining [facility]’s FollowMyHealth portal, you will also be able to view your health information using other applications (apps) compatible with our system.

## 2022-09-09 NOTE — DISCHARGE NOTE PROVIDER - NSDCMRMEDTOKEN_GEN_ALL_CORE_FT
amLODIPine 2.5 mg oral tablet: 1 tab(s) orally once a day  Aspir 81 oral delayed release tablet: 1 tab(s) orally once a day  atorvastatin 10 mg oral tablet: 1 tab(s) orally once a day  ferrous sulfate 325 mg (65 mg elemental iron) oral tablet: 1 tab(s) orally once a day  finasteride 5 mg oral tablet: 1 tab(s) orally once a day  furosemide 40 mg oral tablet: 1 tab(s) orally once a day  Lantus 100 units/mL subcutaneous solution: 33 unit(s) subcutaneous once a day (at bedtime) 16.5 units 9/7/2022  latanoprost 0.005% ophthalmic solution: 1 drop(s) to each affected eye once a day (in the evening)  losartan 100 mg oral tablet: 1 tab(s) orally once a day  metOLazone 2.5 mg oral tablet: 1 tab(s) orally once a day  metoprolol tartrate 25 mg oral tablet: 1 tab(s) orally 2 times a day  NovoLOG FlexPen 100 units/mL injectable solution:   oxycodone-acetaminophen 5 mg-325 mg oral tablet: 1 tab(s) orally every 4 hours MDD:dont take more than 4 pills  tamsulosin 0.4 mg oral capsule: 1 cap(s) orally once a day

## 2022-09-09 NOTE — DISCHARGE NOTE PROVIDER - HOSPITAL COURSE
71 yo male with left lower extremity lymphedema, who presented for washout and debulking, s/p   Operation; excisional debridement of left lower extremity and UNNA boot application for left lower extremity lymphatic mass. Post op admitted for urinary retention and hyperglycemia. On POD 1 patient voided on his own. He was evaluated by PT and deemed stable for discharge home. 71 yo male with left lower extremity lymphedema, who presented for washout and debulking, s/p   Operation; excisional debridement of left lower extremity and UNNA boot application for left lower extremity lymphatic mass. Post op admitted for urinary retention and hyperglycemia. On POD 1 patient voided on his own. Unna boot removed and dressings daily changed. He was evaluated by PT and deemed stable for discharge home.

## 2022-09-09 NOTE — PHYSICAL THERAPY INITIAL EVALUATION ADULT - PLANNED THERAPY INTERVENTIONS, PT EVAL
balance training/gait training/transfer training No PT intervention needed. Patient independent in bed mobility, Close supervision in transfers and ambulation for safety. Contact PT if status changes.

## 2022-09-09 NOTE — CONSULT NOTE ADULT - ASSESSMENT
IMPRESSION: Rehab of gait dysfunction / s/p excisional debridement of LLE lymphatic mass     PRECAUTIONS: [   ] Cardiac  [   ] Respiratory  [   ] Seizures [   ] Contact Isolation  [   ] Droplet Isolation  [   ] Other    Weight Bearing Status:     RECOMMENDATION:    Out of Bed to Chair     DVT/Decubiti Prophylaxis    REHAB PLAN:     [  x  ] Bedside P/T 3-5 times a week   [    ]   Bedside O/T  2-3 times a week             [    ] Speech Therapy               [    ]  No Rehab Therapy Indicated   Conditioning/ROM                                    ADL  Bed Mobility                                               Conditioning/ROM  Transfers                                                     Bed Mobility  Sitting /Standing Balance                         Transfers                                        Gait Training                                               Sitting/Standing Balance  Stair Training [   ]Applicable                    Home equipment Eval                                                                        Splinting  [   ] Only      GOALS:   ADL   [    ]   Independent                    Transfers  [   x ] Independent                          Ambulation  [  x  ] Independent     [  x   ] With device                            [    ]  CG                                                         [    ]  CG                                                                  [    ] CG                            [    ] Min A                                                   [    ] Min A                                                              [    ] Min  A          DISCHARGE PLAN:   [    ]  Good candidate for Intensive Rehabilitation/Hospital based                                             Will tolerate 3hrs Intensive Rehab Daily                                       [     ]  Short Term Rehab in Skilled Nursing Facility                                       [x     ]  Home with Outpatient or  services                                         [     ]  Possible Candidate for Intensive Hospital based Rehab

## 2022-09-09 NOTE — DISCHARGE NOTE NURSING/CASE MANAGEMENT/SOCIAL WORK - NSDCPEPT PROEDMA_GEN_ALL_CORE
Virtual Regular Visit    Problem List Items Addressed This Visit     None      Visit Diagnoses     Moderate depressed bipolar I disorder ([facility] [address] )    -  Primary               Reason for visit is individual mental health therapy session    Encounter provider PENELOPE Lujan    Provider located at [address] [place] [id number]      Recent Visits  Date Type Provider Dept   03/31/20 Telemedicine Bard Barber [facility]   Showing recent visits within past 7 days and meeting all other requirements     Future Appointments  No visits were found meeting these conditions  Showing future appointments within next 150 days and meeting all other requirements        The patient was identified by name and date of birth  Larissa Tapia was informed that this is a telemedicine visit and that the visit is being conducted through   She acknowledged consent and understanding of privacy and security of the video platform  The patient has agreed to participate and understands they can discontinue the visit at any time  Patient is aware this is a billable service  Subjective  Larissa Tapia is a 39 y o  female seen for mental health therapy individual session to address her depression and anxiety         Past Medical History:   Diagnosis Date    Depression     Herniated disc, cervical     Hyperlipidemia        No past surgical history on file      Current Outpatient Medications   Medication Sig Dispense Refill    Acetaminophen (TYLENOL PO) Take 1,000 mg by mouth every 4 (four) hours as needed      cyclobenzaprine (FLEXERIL) 5 mg tablet Take 1 tablet by mouth 3 (three) times a day as needed for muscle spasms for up to 30 doses 30 tablet 0    diclofenac sodium (VOLTAREN) 1 % Apply 2 g topically 4 (four) times a day for 30 days 1 Tube 0    DULoxetine HCl (CYMBALTA PO) Take 30 mg by mouth daily        IRON PO Take 325 mg by mouth        lisinopril-hydrochlorothiazide (PRINZIDE,ZESTORETIC) 20-12 5 MG per tablet Take 1 tablet by mouth daily      traMADol (ULTRAM) 50 mg tablet Take 50 mg by mouth every 6 (six) hours as needed for moderate pain       No current facility-administered medications for this visit  No Known Allergies    Review of Systems    Video Exam    There were no vitals filed for this visit  Physical Exam     As a result of this visit, I have not referred the patient for further respiratory evaluation  Yes

## 2022-09-09 NOTE — PHYSICAL THERAPY INITIAL EVALUATION ADULT - ADDITIONAL COMMENTS
Patient lives alone in 2nd floor unt with 12 steps to enter. Macarena berrios he was indepdnent I ADL;'s and ambulation prior to hospitalization

## 2022-09-09 NOTE — DISCHARGE NOTE NURSING/CASE MANAGEMENT/SOCIAL WORK - NSDCPEFALRISK_GEN_ALL_CORE
For information on Fall & Injury Prevention, visit: [email or web address] OR  [email or web address] OR  https://www.cdc.gov/steadi/patient.html

## 2022-09-09 NOTE — CONSULT NOTE ADULT - SUBJECTIVE AND OBJECTIVE BOX
HPI: Pt states for over 3 years he noticed swelling in both legs that started at the ankles and progressively began moving up towards the knees. Pt states swelling is worse when he consumes a lot of water. Tried medications and other conservative methods without success to relieve swelling. Now for listed procedure.    Denies any chest pain, difficulty breathing, SOB, palpitations, dysuria, URI, or any other infections in the last 2 weeks. Denies any recent travel, contact, or exposure to any persons with known or suspected COVID-19. Pt also denies COVID testing within the last 2 weeks. Pt admits to receiving all doses of Pfizer COVID vaccine.  S/p excisional debridement of LLE lymphatic mass on 9/8      PAST MEDICAL & SURGICAL HISTORY:  HTN (hypertension)      High cholesterol      DM (diabetes mellitus)      Stage 3 chronic kidney disease      Glaucoma      H/O colonoscopy          Hospital Course:    TODAY'S SUBJECTIVE & REVIEW OF SYMPTOMS:     Constitutional WNL   Cardio WNL   Resp WNL   GI WNL  Heme WNL  Endo WNL  Skin WNL  MSK LE edema   Neuro WNL  Cognitive WNL  Psych WNL      MEDICATIONS  (STANDING):  acetaminophen     Tablet .. 650 milliGRAM(s) Oral every 6 hours  amLODIPine   Tablet 2.5 milliGRAM(s) Oral daily  aspirin enteric coated 81 milliGRAM(s) Oral daily  atorvastatin Oral Tab/Cap - Peds 10 milliGRAM(s) Oral daily  dextrose 5%. 1000 milliLiter(s) (50 mL/Hr) IV Continuous <Continuous>  dextrose 5%. 1000 milliLiter(s) (100 mL/Hr) IV Continuous <Continuous>  dextrose 50% Injectable 25 Gram(s) IV Push once  dextrose 50% Injectable 12.5 Gram(s) IV Push once  dextrose 50% Injectable 25 Gram(s) IV Push once  finasteride 5 milliGRAM(s) Oral daily  furosemide    Tablet 40 milliGRAM(s) Oral daily  glucagon  Injectable 1 milliGRAM(s) IntraMuscular once  heparin   Injectable 5000 Unit(s) SubCutaneous every 8 hours  insulin glargine Injectable (LANTUS) 33 Unit(s) SubCutaneous at bedtime  insulin lispro (ADMELOG) corrective regimen sliding scale   SubCutaneous three times a day before meals  losartan 100 milliGRAM(s) Oral daily  metolazone 2.5 milliGRAM(s) Oral daily  metoprolol tartrate 25 milliGRAM(s) Oral two times a day  tamsulosin 0.4 milliGRAM(s) Oral at bedtime    MEDICATIONS  (PRN):  dextrose Oral Gel 15 Gram(s) Oral once PRN Blood Glucose LESS THAN 70 milliGRAM(s)/deciliter      FAMILY HISTORY:  FH: heart disease  father        Allergies    No Known Allergies    Intolerances        SOCIAL HISTORY:    [  ] Etoh  [  ] Smoking  [  ] Substance abuse     Home Environment:  [   ] Home Alone  [ x  ] Lives with Family  [   ] Home Health Aid    Dwelling:  [   ] Apartment  [ x  ] Private House  [   ] Adult Home  [   ] Skilled Nursing Facility      [   ] Short Term  [   ] Long Term  [  x ] Stairs       Elevator [   ]    FUNCTIONAL STATUS PTA: (Check all that apply)  Ambulation: [   x ]Independent    [   ] Dependent     [   ] Non-Ambulatory  Assistive Device: [   ] SA Cane  [   ]  Q Cane  [   ] Walker  [   ]  Wheelchair  ADL : [ x  ] Independent  [    ]  Dependent       Vital Signs Last 24 Hrs  T(C): 36.5 (09 Sep 2022 08:05), Max: 36.9 (09 Sep 2022 00:23)  T(F): 97.7 (09 Sep 2022 08:05), Max: 98.5 (09 Sep 2022 00:23)  HR: 63 (09 Sep 2022 08:05) (48 - 80)  BP: 107/51 (09 Sep 2022 08:05) (107/51 - 161/72)  BP(mean): --  RR: 18 (09 Sep 2022 08:05) (16 - 19)  SpO2: 100% (08 Sep 2022 20:51) (98% - 100%)    Parameters below as of 08 Sep 2022 20:51  Patient On (Oxygen Delivery Method): room air          PHYSICAL EXAM: Awake & Alert  GENERAL: NAD  HEAD:  Normocephalic  CHEST/LUNG: Clear   HEART: S1S2+  ABDOMEN: Soft, Nontender  EXTREMITIES: + ace wrap mauricio LE    NERVOUS SYSTEM:  Cranial Nerves 2-12 intact [   ] Abnormal  [   ]  ROM: WFL all extremities [ x  ]  Abnormal [   ]  Motor Strength: WFL all extremities  [  x ]  Abnormal [   ]  Sensation: intact to light touch [ x  ] Abnormal [   ]    FUNCTIONAL STATUS:  Bed Mobility: Independent [  x ]  Supervision [   ]  Needs Assistance [   ]  N/A [   ]  Transfers: Independent [   ]  Supervision [  x ]  Needs Assistance [   ]  N/A [   ]   Ambulation: Independent [   ]  Supervision [  x ]  Needs Assistance [   ]  N/A [   ]  ADL: Independent [   ] Requires Assistance [   ] N/A [   ]      LABS:                        10.7   8.02  )-----------( 150      ( 09 Sep 2022 07:10 )             31.4     09-09    140  |  103  |  50<H>  ----------------------------<  305<H>  4.6   |  26  |  1.7<H>    Ca    8.9      09 Sep 2022 07:10  Phos  5.6     09-09  Mg     2.0     09-09            RADIOLOGY & ADDITIONAL STUDIES:

## 2022-09-09 NOTE — DISCHARGE NOTE PROVIDER - PROVIDER TOKENS
PROVIDER:[TOKEN:[[id number]],FOLLOWUP:[1 week]] PROVIDER:[TOKEN:[[id number]],FOLLOWUP:[2 weeks]]

## 2022-09-09 NOTE — DISCHARGE NOTE PROVIDER - NSDCQMAMI_CARD_ALL_CORE
No Mixed Nodular And Micronodular Bcc Histology Text: The dermis contains distinctive tumor cell masses of various shapes and sizes composed of cells with large oval or elongated nuclei with relatively little cytoplasm.  The nuclei are homogenous.  There is no pronounced variation in size or intensity of staining and no significant anaplastic appearance.  The cells at the outer aspect of the tumor masses show palisading of nuclei.  Tumor masses are surrounded by a connective tissue stroma and in some areas there is retraction artifact of the tumor nodule away from the surrounding stroma.  A mixed nodular and micronodular pattern is noted.

## 2022-09-09 NOTE — DISCHARGE NOTE PROVIDER - NSDCCPCAREPLAN_GEN_ALL_CORE_FT
PRINCIPAL DISCHARGE DIAGNOSIS  Diagnosis: Leg mass, left  Assessment and Plan of Treatment: s/p excisional debridement, UNNA boot

## 2022-09-09 NOTE — DISCHARGE NOTE PROVIDER - CARE PROVIDER_API CALL
Demetri Evangelista)  Surgery; Vascular Surgery  [address] [place] [address]  Phone: [phone]  Fax: [phone]  Follow Up Time: 1 week   Demetri Evangelista)  Surgery; Vascular Surgery  [address] [place] [address]  Phone: [phone]  Fax: [phone]  Follow Up Time: 2 weeks

## 2022-09-10 LAB
GLUCOSE BLDC GLUCOMTR-MCNC: 172 MG/DL — HIGH (ref 70–99)
GLUCOSE BLDC GLUCOMTR-MCNC: 224 MG/DL — HIGH (ref 70–99)
GLUCOSE BLDC GLUCOMTR-MCNC: 258 MG/DL — HIGH (ref 70–99)
GLUCOSE BLDC GLUCOMTR-MCNC: 327 MG/DL — HIGH (ref 70–99)

## 2022-09-10 RX ORDER — GABAPENTIN 400 MG/1
100 CAPSULE ORAL THREE TIMES A DAY
Refills: 0 | Status: DISCONTINUED | OUTPATIENT
Start: 2022-09-10 | End: 2022-09-14

## 2022-09-10 RX ORDER — INSULIN LISPRO 100/ML
VIAL (ML) SUBCUTANEOUS
Refills: 0 | Status: DISCONTINUED | OUTPATIENT
Start: 2022-09-10 | End: 2022-09-11

## 2022-09-10 RX ADMIN — LOSARTAN POTASSIUM 100 MILLIGRAM(S): 100 TABLET, FILM COATED ORAL at 05:16

## 2022-09-10 RX ADMIN — AMLODIPINE BESYLATE 2.5 MILLIGRAM(S): 2.5 TABLET ORAL at 05:16

## 2022-09-10 RX ADMIN — INSULIN GLARGINE 33 UNIT(S): 100 INJECTION, SOLUTION SUBCUTANEOUS at 21:29

## 2022-09-10 RX ADMIN — Medication 4: at 17:20

## 2022-09-10 RX ADMIN — ATORVASTATIN CALCIUM 10 MILLIGRAM(S): 80 TABLET, FILM COATED ORAL at 12:16

## 2022-09-10 RX ADMIN — Medication 40 MILLIGRAM(S): at 05:17

## 2022-09-10 RX ADMIN — Medication 650 MILLIGRAM(S): at 12:17

## 2022-09-10 RX ADMIN — FINASTERIDE 5 MILLIGRAM(S): 5 TABLET, FILM COATED ORAL at 12:17

## 2022-09-10 RX ADMIN — Medication 650 MILLIGRAM(S): at 17:29

## 2022-09-10 RX ADMIN — TAMSULOSIN HYDROCHLORIDE 0.4 MILLIGRAM(S): 0.4 CAPSULE ORAL at 21:29

## 2022-09-10 RX ADMIN — Medication 81 MILLIGRAM(S): at 12:17

## 2022-09-10 RX ADMIN — Medication 25 MILLIGRAM(S): at 05:16

## 2022-09-10 RX ADMIN — Medication 6: at 12:16

## 2022-09-10 RX ADMIN — Medication 25 MILLIGRAM(S): at 17:30

## 2022-09-11 LAB
GLUCOSE BLDC GLUCOMTR-MCNC: 145 MG/DL — HIGH (ref 70–99)
GLUCOSE BLDC GLUCOMTR-MCNC: 158 MG/DL — HIGH (ref 70–99)
GLUCOSE BLDC GLUCOMTR-MCNC: 261 MG/DL — HIGH (ref 70–99)
GLUCOSE BLDC GLUCOMTR-MCNC: 313 MG/DL — HIGH (ref 70–99)

## 2022-09-11 RX ORDER — INSULIN LISPRO 100/ML
VIAL (ML) SUBCUTANEOUS
Refills: 0 | Status: DISCONTINUED | OUTPATIENT
Start: 2022-09-11 | End: 2022-09-14

## 2022-09-11 RX ADMIN — Medication 9: at 11:38

## 2022-09-11 RX ADMIN — Medication 650 MILLIGRAM(S): at 11:38

## 2022-09-11 RX ADMIN — Medication 40 MILLIGRAM(S): at 05:14

## 2022-09-11 RX ADMIN — TAMSULOSIN HYDROCHLORIDE 0.4 MILLIGRAM(S): 0.4 CAPSULE ORAL at 21:23

## 2022-09-11 RX ADMIN — Medication 5: at 08:15

## 2022-09-11 RX ADMIN — Medication 650 MILLIGRAM(S): at 17:28

## 2022-09-11 RX ADMIN — Medication 25 MILLIGRAM(S): at 05:14

## 2022-09-11 RX ADMIN — AMLODIPINE BESYLATE 2.5 MILLIGRAM(S): 2.5 TABLET ORAL at 05:14

## 2022-09-11 RX ADMIN — LOSARTAN POTASSIUM 100 MILLIGRAM(S): 100 TABLET, FILM COATED ORAL at 05:14

## 2022-09-11 RX ADMIN — INSULIN GLARGINE 33 UNIT(S): 100 INJECTION, SOLUTION SUBCUTANEOUS at 21:23

## 2022-09-11 RX ADMIN — Medication 650 MILLIGRAM(S): at 11:42

## 2022-09-11 RX ADMIN — Medication 81 MILLIGRAM(S): at 11:38

## 2022-09-11 RX ADMIN — FINASTERIDE 5 MILLIGRAM(S): 5 TABLET, FILM COATED ORAL at 11:38

## 2022-09-11 RX ADMIN — ATORVASTATIN CALCIUM 10 MILLIGRAM(S): 80 TABLET, FILM COATED ORAL at 11:38

## 2022-09-11 RX ADMIN — Medication 25 MILLIGRAM(S): at 17:28

## 2022-09-12 LAB
ANION GAP SERPL CALC-SCNC: 11 MMOL/L — SIGNIFICANT CHANGE UP (ref 7–14)
ANION GAP SERPL CALC-SCNC: 9 MMOL/L — SIGNIFICANT CHANGE UP (ref 7–14)
BASOPHILS # BLD AUTO: 0.01 K/UL — SIGNIFICANT CHANGE UP (ref 0–0.2)
BASOPHILS NFR BLD AUTO: 0.2 % — SIGNIFICANT CHANGE UP (ref 0–1)
BUN SERPL-MCNC: 32 MG/DL — HIGH (ref 10–20)
BUN SERPL-MCNC: 34 MG/DL — HIGH (ref 10–20)
CALCIUM SERPL-MCNC: 9.3 MG/DL — SIGNIFICANT CHANGE UP (ref 8.4–10.5)
CALCIUM SERPL-MCNC: 9.3 MG/DL — SIGNIFICANT CHANGE UP (ref 8.5–10.1)
CHLORIDE SERPL-SCNC: 100 MMOL/L — SIGNIFICANT CHANGE UP (ref 98–110)
CHLORIDE SERPL-SCNC: 99 MMOL/L — SIGNIFICANT CHANGE UP (ref 98–110)
CO2 SERPL-SCNC: 28 MMOL/L — SIGNIFICANT CHANGE UP (ref 17–32)
CO2 SERPL-SCNC: 28 MMOL/L — SIGNIFICANT CHANGE UP (ref 17–32)
CREAT SERPL-MCNC: 1.5 MG/DL — SIGNIFICANT CHANGE UP (ref 0.7–1.5)
CREAT SERPL-MCNC: 1.7 MG/DL — HIGH (ref 0.7–1.5)
EGFR: 42 ML/MIN/1.73M2 — LOW
EGFR: 49 ML/MIN/1.73M2 — LOW
EOSINOPHIL # BLD AUTO: 0.14 K/UL — SIGNIFICANT CHANGE UP (ref 0–0.7)
EOSINOPHIL NFR BLD AUTO: 2.2 % — SIGNIFICANT CHANGE UP (ref 0–8)
GLUCOSE BLDC GLUCOMTR-MCNC: 260 MG/DL — HIGH (ref 70–99)
GLUCOSE BLDC GLUCOMTR-MCNC: 264 MG/DL — HIGH (ref 70–99)
GLUCOSE BLDC GLUCOMTR-MCNC: 315 MG/DL — HIGH (ref 70–99)
GLUCOSE BLDC GLUCOMTR-MCNC: 364 MG/DL — HIGH (ref 70–99)
GLUCOSE SERPL-MCNC: 291 MG/DL — HIGH (ref 70–99)
GLUCOSE SERPL-MCNC: 317 MG/DL — HIGH (ref 70–99)
HCT VFR BLD CALC: 30.2 % — LOW (ref 42–52)
HCT VFR BLD CALC: 33.1 % — LOW (ref 42–52)
HGB BLD-MCNC: 10.4 G/DL — LOW (ref 14–18)
HGB BLD-MCNC: 10.9 G/DL — LOW (ref 14–18)
IMM GRANULOCYTES NFR BLD AUTO: 0.3 % — SIGNIFICANT CHANGE UP (ref 0.1–0.3)
LYMPHOCYTES # BLD AUTO: 1.5 K/UL — SIGNIFICANT CHANGE UP (ref 1.2–3.4)
LYMPHOCYTES # BLD AUTO: 23.3 % — SIGNIFICANT CHANGE UP (ref 20.5–51.1)
MAGNESIUM SERPL-MCNC: 1.9 MG/DL — SIGNIFICANT CHANGE UP (ref 1.8–2.4)
MAGNESIUM SERPL-MCNC: 2.2 MG/DL — SIGNIFICANT CHANGE UP (ref 1.8–2.4)
MCHC RBC-ENTMCNC: 29.1 PG — SIGNIFICANT CHANGE UP (ref 27–31)
MCHC RBC-ENTMCNC: 29.8 PG — SIGNIFICANT CHANGE UP (ref 27–31)
MCHC RBC-ENTMCNC: 32.9 G/DL — SIGNIFICANT CHANGE UP (ref 32–37)
MCHC RBC-ENTMCNC: 34.4 G/DL — SIGNIFICANT CHANGE UP (ref 32–37)
MCV RBC AUTO: 86.5 FL — SIGNIFICANT CHANGE UP (ref 80–94)
MCV RBC AUTO: 88.5 FL — SIGNIFICANT CHANGE UP (ref 80–94)
MONOCYTES # BLD AUTO: 0.53 K/UL — SIGNIFICANT CHANGE UP (ref 0.1–0.6)
MONOCYTES NFR BLD AUTO: 8.2 % — SIGNIFICANT CHANGE UP (ref 1.7–9.3)
NEUTROPHILS # BLD AUTO: 4.23 K/UL — SIGNIFICANT CHANGE UP (ref 1.4–6.5)
NEUTROPHILS NFR BLD AUTO: 65.8 % — SIGNIFICANT CHANGE UP (ref 42.2–75.2)
NRBC # BLD: 0 /100 WBCS — SIGNIFICANT CHANGE UP (ref 0–0)
NRBC # BLD: 0 /100 WBCS — SIGNIFICANT CHANGE UP (ref 0–0)
PHOSPHATE SERPL-MCNC: 2.8 MG/DL — SIGNIFICANT CHANGE UP (ref 2.1–4.9)
PHOSPHATE SERPL-MCNC: 3.1 MG/DL — SIGNIFICANT CHANGE UP (ref 2.1–4.9)
PLATELET # BLD AUTO: 149 K/UL — SIGNIFICANT CHANGE UP (ref 130–400)
PLATELET # BLD AUTO: 195 K/UL — SIGNIFICANT CHANGE UP (ref 130–400)
POTASSIUM SERPL-MCNC: 3.9 MMOL/L — SIGNIFICANT CHANGE UP (ref 3.5–5)
POTASSIUM SERPL-MCNC: 4 MMOL/L — SIGNIFICANT CHANGE UP (ref 3.5–5)
POTASSIUM SERPL-SCNC: 3.9 MMOL/L — SIGNIFICANT CHANGE UP (ref 3.5–5)
POTASSIUM SERPL-SCNC: 4 MMOL/L — SIGNIFICANT CHANGE UP (ref 3.5–5)
RBC # BLD: 3.49 M/UL — LOW (ref 4.7–6.1)
RBC # BLD: 3.74 M/UL — LOW (ref 4.7–6.1)
RBC # FLD: 16.1 % — HIGH (ref 11.5–14.5)
RBC # FLD: 16.2 % — HIGH (ref 11.5–14.5)
SODIUM SERPL-SCNC: 136 MMOL/L — SIGNIFICANT CHANGE UP (ref 135–146)
SODIUM SERPL-SCNC: 139 MMOL/L — SIGNIFICANT CHANGE UP (ref 135–146)
WBC # BLD: 6.43 K/UL — SIGNIFICANT CHANGE UP (ref 4.8–10.8)
WBC # BLD: 7.73 K/UL — SIGNIFICANT CHANGE UP (ref 4.8–10.8)
WBC # FLD AUTO: 6.43 K/UL — SIGNIFICANT CHANGE UP (ref 4.8–10.8)
WBC # FLD AUTO: 7.73 K/UL — SIGNIFICANT CHANGE UP (ref 4.8–10.8)

## 2022-09-12 RX ORDER — INSULIN LISPRO 100/ML
4 VIAL (ML) SUBCUTANEOUS ONCE
Refills: 0 | Status: COMPLETED | OUTPATIENT
Start: 2022-09-12 | End: 2022-09-12

## 2022-09-12 RX ORDER — INSULIN LISPRO 100/ML
12 VIAL (ML) SUBCUTANEOUS
Refills: 0 | Status: DISCONTINUED | OUTPATIENT
Start: 2022-09-12 | End: 2022-09-14

## 2022-09-12 RX ADMIN — Medication 40 MILLIGRAM(S): at 06:03

## 2022-09-12 RX ADMIN — FINASTERIDE 5 MILLIGRAM(S): 5 TABLET, FILM COATED ORAL at 11:19

## 2022-09-12 RX ADMIN — Medication 12 UNIT(S): at 17:03

## 2022-09-12 RX ADMIN — Medication 25 MILLIGRAM(S): at 06:02

## 2022-09-12 RX ADMIN — Medication 81 MILLIGRAM(S): at 11:19

## 2022-09-12 RX ADMIN — TAMSULOSIN HYDROCHLORIDE 0.4 MILLIGRAM(S): 0.4 CAPSULE ORAL at 21:59

## 2022-09-12 RX ADMIN — Medication 9: at 07:58

## 2022-09-12 RX ADMIN — HEPARIN SODIUM 5000 UNIT(S): 5000 INJECTION INTRAVENOUS; SUBCUTANEOUS at 21:59

## 2022-09-12 RX ADMIN — AMLODIPINE BESYLATE 2.5 MILLIGRAM(S): 2.5 TABLET ORAL at 06:04

## 2022-09-12 RX ADMIN — Medication 650 MILLIGRAM(S): at 17:09

## 2022-09-12 RX ADMIN — INSULIN GLARGINE 33 UNIT(S): 100 INJECTION, SOLUTION SUBCUTANEOUS at 21:59

## 2022-09-12 RX ADMIN — Medication 4 UNIT(S): at 21:59

## 2022-09-12 RX ADMIN — LOSARTAN POTASSIUM 100 MILLIGRAM(S): 100 TABLET, FILM COATED ORAL at 06:03

## 2022-09-12 RX ADMIN — Medication 9: at 11:18

## 2022-09-12 RX ADMIN — Medication 650 MILLIGRAM(S): at 11:18

## 2022-09-12 RX ADMIN — ATORVASTATIN CALCIUM 10 MILLIGRAM(S): 80 TABLET, FILM COATED ORAL at 11:19

## 2022-09-12 RX ADMIN — Medication 650 MILLIGRAM(S): at 18:45

## 2022-09-12 RX ADMIN — HEPARIN SODIUM 5000 UNIT(S): 5000 INJECTION INTRAVENOUS; SUBCUTANEOUS at 13:03

## 2022-09-12 RX ADMIN — Medication 650 MILLIGRAM(S): at 23:07

## 2022-09-12 RX ADMIN — Medication 25 MILLIGRAM(S): at 17:09

## 2022-09-12 NOTE — CONSULT NOTE ADULT - ASSESSMENT
71 yo male with left lower extremity lymphedema, who presented for washout and debulking, s/p excisional debridement of left lower extremity and UNNA boot application for left lower extremity lymphatic mass endocrinology consulted for poor glycemic control.    #persistent hyperglycemia  - A1c 8.9 (8/2022)  - home medications: lantus 33 units and novolog?  - no steroids during admission  - currently on lantus 33 and sliding scale  - blood sugars ranging from 100-600  - Recommend keeping lantus 33 units at bedside and adding mealtime insulin   - Lantus 33 units with Lispro 12 TID before meals + sliding scale      ***INCOMPLETE NOTE**** 71 yo male with left lower extremity lymphedema, who presented for washout and debulking, s/p excisional debridement of left lower extremity and UNNA boot application for left lower extremity lymphatic mass endocrinology consulted for poor glycemic control.    #persistent hyperglycemia 2/2 to medication changes  - A1c 8.9 (8/2022)  - home medications: lantus 33 units and novolog 18 units in AM and 26 at lunch and dinner   - no steroids during admission  - currently on lantus 33 and sliding scale  - blood sugars ranging from 100-300  - Recommend keeping lantus 33 units at bedside and adding mealtime insulin   - Lantus 33 units with Lispro 12 TID before meals + sliding scale      ***INCOMPLETE NOTE**** 73 yo male with left lower extremity lymphedema, who presented for washout and debulking, s/p excisional debridement of left lower extremity and UNNA boot application for left lower extremity lymphatic mass; endocrinology consulted for poor glycemic control.    #persistent hyperglycemia 2/2 to medication suboptimization   - A1c 8.9 (8/2022)  - home medications: lantus 33 units and novolog 18 units in AM and 26 at lunch and dinner   - no steroids during admission  - currently on lantus 33 and sliding scale  - blood sugars ranging from 100-300  - Patient reports was diagnosed with type 2 DM ~2006 in which follows up with Dr. Christine Boss at [place]; has been on insulin since then (had a trial of Trulicity but was stopped due to insurance reasons)  - Patient reports to be complaint with medications and diet and is very upset of the meals provided with him during his current hospitalization  - Please ensure patient is receiving a carb consistent diet   - Recommend Lantus 35 units at bedside and lispro 12/12/12 with corrective sliding scale   - Patient would benefit from addition of SGLPT2 medication and informed patient to discuss this with primary endocrinologist on discharge    73 yo male with left lower extremity lymphedema, who presented for washout and debulking, s/p excisional debridement of left lower extremity and UNNA boot application for left lower extremity lymphatic mass; endocrinology consulted for poor glycemic control.    #persistent hyperglycemia 2/2 to medication suboptimization   - A1c 8.9 (8/2022)  - home medications: lantus 33 units and novolog 18 units in AM and 26 at lunch and dinner   - no steroids during admission  - currently on lantus 33 and sliding scale  - blood sugars ranging from 100-300  - Patient reports was diagnosed with type 2 DM ~2006 in which follows up with Dr. Christine Boss at [place]; has been on insulin since then (had a trial of Trulicity but was stopped due to insurance reasons)  - Patient reports to be complaint with medications and diet and is very upset of the meals provided with him during his current hospitalization  - Please ensure patient is receiving a carb consistent diet   - Recommend Lantus 35 units at bedside and lispro 12/12/12 with corrective sliding scale

## 2022-09-12 NOTE — CONSULT NOTE ADULT - SUBJECTIVE AND OBJECTIVE BOX
HPI:      Allergies    No Known Allergies    Intolerances        MEDICATIONS  (STANDING):  acetaminophen     Tablet .. 650 milliGRAM(s) Oral every 6 hours  amLODIPine   Tablet 2.5 milliGRAM(s) Oral daily  aspirin enteric coated 81 milliGRAM(s) Oral daily  atorvastatin Oral Tab/Cap - Peds 10 milliGRAM(s) Oral daily  dextrose 5%. 1000 milliLiter(s) (50 mL/Hr) IV Continuous <Continuous>  dextrose 5%. 1000 milliLiter(s) (100 mL/Hr) IV Continuous <Continuous>  dextrose 50% Injectable 25 Gram(s) IV Push once  dextrose 50% Injectable 12.5 Gram(s) IV Push once  dextrose 50% Injectable 25 Gram(s) IV Push once  finasteride 5 milliGRAM(s) Oral daily  furosemide    Tablet 40 milliGRAM(s) Oral daily  glucagon  Injectable 1 milliGRAM(s) IntraMuscular once  heparin   Injectable 5000 Unit(s) SubCutaneous every 8 hours  insulin glargine Injectable (LANTUS) 33 Unit(s) SubCutaneous at bedtime  insulin lispro (ADMELOG) corrective regimen sliding scale   SubCutaneous three times a day before meals  losartan 100 milliGRAM(s) Oral daily  metolazone 2.5 milliGRAM(s) Oral daily  metoprolol tartrate 25 milliGRAM(s) Oral two times a day  tamsulosin 0.4 milliGRAM(s) Oral at bedtime    MEDICATIONS  (PRN):  dextrose Oral Gel 15 Gram(s) Oral once PRN Blood Glucose LESS THAN 70 milliGRAM(s)/deciliter  gabapentin 100 milliGRAM(s) Oral three times a day PRN Pain      PAST MEDICAL & SURGICAL HISTORY:  HTN (hypertension)      High cholesterol      DM (diabetes mellitus)      Stage 3 chronic kidney disease      Glaucoma      H/O colonoscopy          FAMILY HISTORY:  FH: heart disease  father        SOCIAL HISTORY: No EtOH, no tobacco    REVIEW OF SYSTEMS:    CONSTITUTIONAL: no fever  EYES/ENT: No visual changes;  no throat pain   NECK: No pain or stiffness  RESPIRATORY: no sob  CARDIOVASCULAR: No chest pain or palpitations  GASTROINTESTINAL: No abdominal or epigastric pain. No NV/D/C  GENITOURINARY: No dysuria, change in frequency or hematuria  NEUROLOGICAL: No numbness or weakness  SKIN: No itching, burning, rashes, or lesions   Psych: No depression   MSK: no joint pain  Allergy: no urticaria         T(F): 98.1 (09-12-22 @ 08:00), Max: 98.1 (09-12-22 @ 08:00)  HR: 88 (09-12-22 @ 08:00)  BP: 114/58 (09-12-22 @ 08:00)  RR: 18 (09-12-22 @ 08:00)  SpO2: --  Wt(kg): --    GENERAL: NAD  HEENT: EOMI, MMM, no oropharyngeal lesions or erythema appreciated  Pulm: no inc wob  CV: RRR  ABDOMEN: soft, nt, nd  MSK: nl ROM  EXTREMITIES:  no appreciable edema in b/l LE  Neuro: A&Ox3, no focal deficits  SKIN: warm and dry, no visible rash                          10.4   6.43  )-----------( 149      ( 12 Sep 2022 01:24 )             30.2       09-12    139  |  100  |  32<H>  ----------------------------<  317<H>  3.9   |  28  |  1.5    Ca    9.3      12 Sep 2022 01:24  Phos  3.1     09-12  Mg     1.9     09-12        Magnesium, Serum: 1.9 mg/dL (09-12 @ 01:24)  Phosphorus Level, Serum: 3.1 mg/dL (09-12 @ 01:24)       HPI: Pt states for over 3 years he noticed swelling in both legs that started at the ankles and progressively began moving up towards the knees. Pt states swelling is worse when he consumes a lot of water. Tried medications and other conservative methods without success to relieve swelling. Now for listed procedure.    Denies any chest pain, difficulty breathing, SOB, palpitations, dysuria, URI, or any other infections in the last 2 weeks. Denies any recent travel, contact, or exposure to any persons with known or suspected COVID-19. Pt also denies COVID testing within the last 2 weeks. Pt admits to receiving all doses of Pfizer COVID vaccine.  S/p excisional debridement of LLE lymphatic mass on 9/8    Allergies    No Known Allergies    Intolerances        MEDICATIONS  (STANDING):  acetaminophen     Tablet .. 650 milliGRAM(s) Oral every 6 hours  amLODIPine   Tablet 2.5 milliGRAM(s) Oral daily  aspirin enteric coated 81 milliGRAM(s) Oral daily  atorvastatin Oral Tab/Cap - Peds 10 milliGRAM(s) Oral daily  dextrose 5%. 1000 milliLiter(s) (50 mL/Hr) IV Continuous <Continuous>  dextrose 5%. 1000 milliLiter(s) (100 mL/Hr) IV Continuous <Continuous>  dextrose 50% Injectable 25 Gram(s) IV Push once  dextrose 50% Injectable 12.5 Gram(s) IV Push once  dextrose 50% Injectable 25 Gram(s) IV Push once  finasteride 5 milliGRAM(s) Oral daily  furosemide    Tablet 40 milliGRAM(s) Oral daily  glucagon  Injectable 1 milliGRAM(s) IntraMuscular once  heparin   Injectable 5000 Unit(s) SubCutaneous every 8 hours  insulin glargine Injectable (LANTUS) 33 Unit(s) SubCutaneous at bedtime  insulin lispro (ADMELOG) corrective regimen sliding scale   SubCutaneous three times a day before meals  losartan 100 milliGRAM(s) Oral daily  metolazone 2.5 milliGRAM(s) Oral daily  metoprolol tartrate 25 milliGRAM(s) Oral two times a day  tamsulosin 0.4 milliGRAM(s) Oral at bedtime    MEDICATIONS  (PRN):  dextrose Oral Gel 15 Gram(s) Oral once PRN Blood Glucose LESS THAN 70 milliGRAM(s)/deciliter  gabapentin 100 milliGRAM(s) Oral three times a day PRN Pain      PAST MEDICAL & SURGICAL HISTORY:  HTN (hypertension)      High cholesterol      DM (diabetes mellitus)      Stage 3 chronic kidney disease      Glaucoma      H/O colonoscopy          FAMILY HISTORY:  FH: heart disease  father        SOCIAL HISTORY: No EtOH, no tobacco    REVIEW OF SYSTEMS:    CONSTITUTIONAL: no fever  EYES/ENT: No visual changes;  no throat pain   NECK: No pain or stiffness  RESPIRATORY: no sob  CARDIOVASCULAR: No chest pain or palpitations  GASTROINTESTINAL: No abdominal or epigastric pain. No NV/D/C  GENITOURINARY: No dysuria, change in frequency or hematuria  NEUROLOGICAL: No numbness or weakness  SKIN: No itching, burning, rashes, or lesions   Psych: No depression   MSK: no joint pain  Allergy: no urticaria         T(F): 98.1 (09-12-22 @ 08:00), Max: 98.1 (09-12-22 @ 08:00)  HR: 88 (09-12-22 @ 08:00)  BP: 114/58 (09-12-22 @ 08:00)  RR: 18 (09-12-22 @ 08:00)  SpO2: --  Wt(kg): --    GENERAL: NAD  HEENT: EOMI, MMM, no oropharyngeal lesions or erythema appreciated  Pulm: no inc wob  CV: RRR  ABDOMEN: soft, nt, nd  MSK: nl ROM  EXTREMITIES:  no appreciable edema in b/l LE  Neuro: A&Ox3, no focal deficits  SKIN: warm and dry, no visible rash                          10.4   6.43  )-----------( 149      ( 12 Sep 2022 01:24 )             30.2       09-12    139  |  100  |  32<H>  ----------------------------<  317<H>  3.9   |  28  |  1.5    Ca    9.3      12 Sep 2022 01:24  Phos  3.1     09-12  Mg     1.9     09-12        Magnesium, Serum: 1.9 mg/dL (09-12 @ 01:24)  Phosphorus Level, Serum: 3.1 mg/dL (09-12 @ 01:24)       HPI: Pt states for over 3 years he noticed swelling in both legs that started at the ankles and progressively began moving up towards the knees. Pt states swelling is worse when he consumes a lot of water. Tried medications and other conservative methods without success to relieve swelling. Now for listed procedure.    Denies any chest pain, difficulty breathing, SOB, palpitations, dysuria, URI, or any other infections in the last 2 weeks. Denies any recent travel, contact, or exposure to any persons with known or suspected COVID-19. Pt also denies COVID testing within the last 2 weeks. Pt admits to receiving all doses of Pfizer COVID vaccine.  S/p excisional debridement of LLE lymphatic mass on 9/8    Allergies    No Known Allergies    Intolerances        MEDICATIONS  (STANDING):  acetaminophen     Tablet .. 650 milliGRAM(s) Oral every 6 hours  amLODIPine   Tablet 2.5 milliGRAM(s) Oral daily  aspirin enteric coated 81 milliGRAM(s) Oral daily  atorvastatin Oral Tab/Cap - Peds 10 milliGRAM(s) Oral daily  dextrose 5%. 1000 milliLiter(s) (50 mL/Hr) IV Continuous <Continuous>  dextrose 5%. 1000 milliLiter(s) (100 mL/Hr) IV Continuous <Continuous>  dextrose 50% Injectable 25 Gram(s) IV Push once  dextrose 50% Injectable 12.5 Gram(s) IV Push once  dextrose 50% Injectable 25 Gram(s) IV Push once  finasteride 5 milliGRAM(s) Oral daily  furosemide    Tablet 40 milliGRAM(s) Oral daily  glucagon  Injectable 1 milliGRAM(s) IntraMuscular once  heparin   Injectable 5000 Unit(s) SubCutaneous every 8 hours  insulin glargine Injectable (LANTUS) 33 Unit(s) SubCutaneous at bedtime  insulin lispro (ADMELOG) corrective regimen sliding scale   SubCutaneous three times a day before meals  losartan 100 milliGRAM(s) Oral daily  metolazone 2.5 milliGRAM(s) Oral daily  metoprolol tartrate 25 milliGRAM(s) Oral two times a day  tamsulosin 0.4 milliGRAM(s) Oral at bedtime    MEDICATIONS  (PRN):  dextrose Oral Gel 15 Gram(s) Oral once PRN Blood Glucose LESS THAN 70 milliGRAM(s)/deciliter  gabapentin 100 milliGRAM(s) Oral three times a day PRN Pain      PAST MEDICAL & SURGICAL HISTORY:  HTN (hypertension)      High cholesterol      DM (diabetes mellitus)      Stage 3 chronic kidney disease      Glaucoma      H/O colonoscopy          FAMILY HISTORY:  FH: heart disease  father        SOCIAL HISTORY: No EtOH, no tobacco    REVIEW OF SYSTEMS:    CONSTITUTIONAL: no fever  EYES/ENT: No visual changes;  no throat pain   NECK: No pain or stiffness  RESPIRATORY: no sob  CARDIOVASCULAR: No chest pain or palpitations  GASTROINTESTINAL: No abdominal or epigastric pain. No NV/D/C  GENITOURINARY: No dysuria, change in frequency or hematuria  NEUROLOGICAL: No numbness or weakness  SKIN: No itching, burning, rashes, or lesions   Psych: No depression   MSK: no joint pain  Allergy: no urticaria         T(F): 98.1 (09-12-22 @ 08:00), Max: 98.1 (09-12-22 @ 08:00)  HR: 88 (09-12-22 @ 08:00)  BP: 114/58 (09-12-22 @ 08:00)  RR: 18 (09-12-22 @ 08:00)  SpO2: --  Wt(kg): --    GENERAL: NAD  HEENT: EOMI, MMM, no oropharyngeal lesions or erythema appreciated  PULM/CVS: normal s1, s2; no increased work of breathing   ABDOMEN: soft, nontender, non distended   MSK: nl ROM  EXTREMITIES:  + bilateral lower extremity bandages   Neuro: A&Ox3, no focal deficits  SKIN: warm and dry, no visible rash                          10.4   6.43  )-----------( 149      ( 12 Sep 2022 01:24 )             30.2       09-12    139  |  100  |  32<H>  ----------------------------<  317<H>  3.9   |  28  |  1.5    Ca    9.3      12 Sep 2022 01:24  Phos  3.1     09-12  Mg     1.9     09-12        Magnesium, Serum: 1.9 mg/dL (09-12 @ 01:24)  Phosphorus Level, Serum: 3.1 mg/dL (09-12 @ 01:24)

## 2022-09-12 NOTE — CONSULT NOTE ADULT - ATTENDING COMMENTS
71 yo male with left lower extremity lymphedema, who presented for washout and debulking, s/p excisional debridement of left lower extremity and UNNA boot application for left lower extremity lymphatic mass endocrinology consulted for poor glycemic control.    #Type 2 DM with nephropathy  - inaqeduate control with A1c 8.9,  - On Lantus 33 units and Novolog at home ( 18-26-26) but stated that he does miss a dose every now and then  - Currently on Lantus 33 units in the hospital and sliding scla  - REcommend increasing dose of Lantus to 35 units and Novolog to 12 units TID with a sliding scale of 1: 50 > 150  - May benefit from GLP1 analogs was previously on Trulicity but was discontinued after his insurance changed, can be started outpatient   - GFR borderline>45 may also benefit from SGLT 2 inhibitors for renoprotection, will likely not affect A1c, can be discussed outpatient

## 2022-09-13 LAB
GLUCOSE BLDC GLUCOMTR-MCNC: 100 MG/DL — HIGH (ref 70–99)
GLUCOSE BLDC GLUCOMTR-MCNC: 200 MG/DL — HIGH (ref 70–99)
GLUCOSE BLDC GLUCOMTR-MCNC: 208 MG/DL — HIGH (ref 70–99)
GLUCOSE BLDC GLUCOMTR-MCNC: 300 MG/DL — HIGH (ref 70–99)
GLUCOSE BLDC GLUCOMTR-MCNC: 377 MG/DL — HIGH (ref 70–99)
GLUCOSE BLDC GLUCOMTR-MCNC: 74 MG/DL — SIGNIFICANT CHANGE UP (ref 70–99)
GLUCOSE BLDC GLUCOMTR-MCNC: 80 MG/DL — SIGNIFICANT CHANGE UP (ref 70–99)

## 2022-09-13 RX ADMIN — Medication 650 MILLIGRAM(S): at 05:31

## 2022-09-13 RX ADMIN — HEPARIN SODIUM 5000 UNIT(S): 5000 INJECTION INTRAVENOUS; SUBCUTANEOUS at 13:18

## 2022-09-13 RX ADMIN — Medication 650 MILLIGRAM(S): at 12:05

## 2022-09-13 RX ADMIN — FINASTERIDE 5 MILLIGRAM(S): 5 TABLET, FILM COATED ORAL at 12:05

## 2022-09-13 RX ADMIN — HEPARIN SODIUM 5000 UNIT(S): 5000 INJECTION INTRAVENOUS; SUBCUTANEOUS at 05:32

## 2022-09-13 RX ADMIN — Medication 650 MILLIGRAM(S): at 17:51

## 2022-09-13 RX ADMIN — Medication 650 MILLIGRAM(S): at 12:36

## 2022-09-13 RX ADMIN — Medication 40 MILLIGRAM(S): at 05:32

## 2022-09-13 RX ADMIN — Medication 12 UNIT(S): at 12:04

## 2022-09-13 RX ADMIN — Medication 650 MILLIGRAM(S): at 23:50

## 2022-09-13 RX ADMIN — Medication 12 UNIT(S): at 17:20

## 2022-09-13 RX ADMIN — LOSARTAN POTASSIUM 100 MILLIGRAM(S): 100 TABLET, FILM COATED ORAL at 05:32

## 2022-09-13 RX ADMIN — Medication 81 MILLIGRAM(S): at 12:05

## 2022-09-13 RX ADMIN — Medication 25 MILLIGRAM(S): at 05:32

## 2022-09-13 RX ADMIN — INSULIN GLARGINE 33 UNIT(S): 100 INJECTION, SOLUTION SUBCUTANEOUS at 22:23

## 2022-09-13 RX ADMIN — ATORVASTATIN CALCIUM 10 MILLIGRAM(S): 80 TABLET, FILM COATED ORAL at 12:05

## 2022-09-13 RX ADMIN — Medication 13: at 12:04

## 2022-09-13 RX ADMIN — AMLODIPINE BESYLATE 2.5 MILLIGRAM(S): 2.5 TABLET ORAL at 05:32

## 2022-09-13 RX ADMIN — Medication 25 MILLIGRAM(S): at 17:20

## 2022-09-13 RX ADMIN — HEPARIN SODIUM 5000 UNIT(S): 5000 INJECTION INTRAVENOUS; SUBCUTANEOUS at 21:32

## 2022-09-13 RX ADMIN — TAMSULOSIN HYDROCHLORIDE 0.4 MILLIGRAM(S): 0.4 CAPSULE ORAL at 21:32

## 2022-09-13 RX ADMIN — Medication 650 MILLIGRAM(S): at 17:20

## 2022-09-13 RX ADMIN — Medication 5: at 17:20

## 2022-09-14 ENCOUNTER — APPOINTMENT (OUTPATIENT)
Dept: VASCULAR SURGERY | Facility: CLINIC | Age: 72
End: 2022-09-14

## 2022-09-14 VITALS
SYSTOLIC BLOOD PRESSURE: 100 MMHG | RESPIRATION RATE: 18 BRPM | DIASTOLIC BLOOD PRESSURE: 65 MMHG | TEMPERATURE: 96 F | HEART RATE: 87 BPM

## 2022-09-14 LAB
GLUCOSE BLDC GLUCOMTR-MCNC: 313 MG/DL — HIGH (ref 70–99)
GLUCOSE BLDC GLUCOMTR-MCNC: 54 MG/DL — CRITICAL LOW (ref 70–99)

## 2022-09-14 RX ADMIN — Medication 11: at 11:55

## 2022-09-14 RX ADMIN — HEPARIN SODIUM 5000 UNIT(S): 5000 INJECTION INTRAVENOUS; SUBCUTANEOUS at 05:33

## 2022-09-14 RX ADMIN — HEPARIN SODIUM 5000 UNIT(S): 5000 INJECTION INTRAVENOUS; SUBCUTANEOUS at 13:27

## 2022-09-14 RX ADMIN — AMLODIPINE BESYLATE 2.5 MILLIGRAM(S): 2.5 TABLET ORAL at 05:32

## 2022-09-14 RX ADMIN — Medication 650 MILLIGRAM(S): at 12:30

## 2022-09-14 RX ADMIN — Medication 81 MILLIGRAM(S): at 11:55

## 2022-09-14 RX ADMIN — ATORVASTATIN CALCIUM 10 MILLIGRAM(S): 80 TABLET, FILM COATED ORAL at 11:55

## 2022-09-14 RX ADMIN — Medication 40 MILLIGRAM(S): at 05:32

## 2022-09-14 RX ADMIN — Medication 650 MILLIGRAM(S): at 11:55

## 2022-09-14 RX ADMIN — Medication 12 UNIT(S): at 11:55

## 2022-09-14 RX ADMIN — FINASTERIDE 5 MILLIGRAM(S): 5 TABLET, FILM COATED ORAL at 11:55

## 2022-09-14 RX ADMIN — Medication 650 MILLIGRAM(S): at 05:32

## 2022-09-14 RX ADMIN — Medication 25 MILLIGRAM(S): at 05:33

## 2022-09-14 RX ADMIN — LOSARTAN POTASSIUM 100 MILLIGRAM(S): 100 TABLET, FILM COATED ORAL at 05:33

## 2022-09-14 NOTE — PROGRESS NOTE ADULT - ASSESSMENT
ASSESSMENT:  72y M s/p excisional debridement of LLE lymphatic mass    PLAN:  endocrinology consult appreciated, monitor fingerstick   follow up social work  for discharge  pain control  incentive spirometry  DVT/GI prophylaxis  SCDs, IS  encourage ambulation    spectra [phone]
ASSESSMENT:  72y M s/p excisional debridement of LLE lymphatic mass    PLAN:  - Monitor fingersticks, endocrinology recs appreciated  - F/U with social work for discharge  - Pain control  - Encourage IS  - DVT ppx, SCDs  - Encourage ambulation    [id number]
PLAN:  - feed patient and provide respective ISS coverage +lantus at night  - continue flomax and f/u repeat TOV after straight cath overnight   - will change LLE dressing   -plan for DC home   
[id number]:   71 yo male with left lower extremity lymphedema, who presented for washout and debulking, s/p excisional debridement of left lower extremity and UNNA boot application for left lower extremity lymphatic mass. Post op admitted for urinary retention and hyperglycemia. On POD 1 patient voided on his own. He was evaluated by PT and deemed stable for discharge home, however has been persistently hyperglycemic with poor control.    PLAN:  - Endocrinology consulted for glucose control, currently on sliding scale and Lantus 33U before bedtime  - Dispo planning, f/u social work/case management for home placement (pt states he is homeless)  - Hemodynamic monitoring   - Multimodal pain control  - UNNA boot removed, dressings to be changed once daily with Adaptic, 4x4, ABD, kerlix, and ACE    [phone]
ASSESSMENT:   73 yo male with left lower extremity lymphedema, who presented for washout and debulking, s/p Operation; excisional debridement of left lower extremity and UNNA boot application for left lower extremity lymphatic mass. Post op admitted for urinary retention and hyperglycemia. On POD 1 patient voided on his own. He was evaluated by PT and deemed stable for discharge home.    PLAN:  - Patient medically stable for discharge  - Disposition issue, will follow up with case management and social work  - Hemodynamic monitoring   - Multimodal pain control  - C/w dressing changes   - UNNA Boot in place   - Spectra [phone] for questions or concerns   
REcommend decreasing lantus dose down to 30 units  - continue with 12 units of prandial insulin   -PLEASE NOTE Current sliding scale is 5 units for every 50 mg/dl above 150 mg/dl, as per previous recommendations, please decrease this to 1 unit for evervy 50 mg /dl above 150 mg/dl

## 2022-09-14 NOTE — PROGRESS NOTE ADULT - SUBJECTIVE AND OBJECTIVE BOX
Blood glucose readings variable. Some lows noted in the am and post prandial 
GENERAL SURGERY PROGRESS NOTE    Patient: GAUTAM MEYERS , 72y ([birth year])Male   MRN: [id number]  Location: [place] [address] [address]  Visit: 09-12-22 Inpatient  Date: 09-13-22 @ 01:08    Procedure/Dx/Injuries: s/p excisional debridement of LLE lymphatic mass    Events of past 24 hours: continues to have high fingersticks, seen by endocrinology    PAST MEDICAL & SURGICAL HISTORY:  HTN (hypertension)      High cholesterol      DM (diabetes mellitus)      Stage 3 chronic kidney disease      Glaucoma      H/O colonoscopy          Vitals:   T(F): 97.3 (09-13-22 @ 00:24), Max: 98.1 (09-12-22 @ 08:00)  HR: 65 (09-13-22 @ 00:24)  BP: 132/60 (09-13-22 @ 00:24)  RR: 18 (09-13-22 @ 00:24)  SpO2: --      Diet, Consistent Carbohydrate/No Snacks      Fluids:     I & O's:    09-11-22 @ 07:01  -  09-12-22 @ 07:00  --------------------------------------------------------  IN:    Oral Fluid: 510 mL  Total IN: 510 mL    OUT:    Voided (mL): 400 mL  Total OUT: 400 mL    Total NET: 110 mL        Bowel Movement: : [] YES [] NO  Flatus: : [] YES [] NO    PHYSICAL EXAM:  General: NAD, AAOx3,  Cardiac: RRR S1, S2,  Respiratory: CTAB  Abdomen: Soft, non-distended, non-tender,   Incision/wound: LLE healing well, dressings in place, clean, dry and intact    MEDICATIONS  (STANDING):  acetaminophen     Tablet .. 650 milliGRAM(s) Oral every 6 hours  amLODIPine   Tablet 2.5 milliGRAM(s) Oral daily  aspirin enteric coated 81 milliGRAM(s) Oral daily  atorvastatin Oral Tab/Cap - Peds 10 milliGRAM(s) Oral daily  dextrose 5%. 1000 milliLiter(s) (50 mL/Hr) IV Continuous <Continuous>  dextrose 5%. 1000 milliLiter(s) (100 mL/Hr) IV Continuous <Continuous>  dextrose 50% Injectable 25 Gram(s) IV Push once  dextrose 50% Injectable 12.5 Gram(s) IV Push once  dextrose 50% Injectable 25 Gram(s) IV Push once  finasteride 5 milliGRAM(s) Oral daily  furosemide    Tablet 40 milliGRAM(s) Oral daily  glucagon  Injectable 1 milliGRAM(s) IntraMuscular once  heparin   Injectable 5000 Unit(s) SubCutaneous every 8 hours  insulin glargine Injectable (LANTUS) 33 Unit(s) SubCutaneous at bedtime  insulin lispro (ADMELOG) corrective regimen sliding scale   SubCutaneous three times a day before meals  insulin lispro Injectable (ADMELOG) 12 Unit(s) SubCutaneous three times a day before meals  losartan 100 milliGRAM(s) Oral daily  metolazone 2.5 milliGRAM(s) Oral daily  metoprolol tartrate 25 milliGRAM(s) Oral two times a day  tamsulosin 0.4 milliGRAM(s) Oral at bedtime    MEDICATIONS  (PRN):  dextrose Oral Gel 15 Gram(s) Oral once PRN Blood Glucose LESS THAN 70 milliGRAM(s)/deciliter  gabapentin 100 milliGRAM(s) Oral three times a day PRN Pain      DVT PROPHYLAXIS: heparin   Injectable 5000 Unit(s) SubCutaneous every 8 hours    GI PROPHYLAXIS:   ANTICOAGULATION:   ANTIBIOTICS:            LAB/STUDIES:  Labs:  CAPILLARY BLOOD GLUCOSE      POCT Blood Glucose.: 208 mg/dL (13 Sep 2022 00:05)  POCT Blood Glucose.: 315 mg/dL (12 Sep 2022 21:06)  POCT Blood Glucose.: 364 mg/dL (12 Sep 2022 16:32)  POCT Blood Glucose.: 260 mg/dL (12 Sep 2022 11:09)  POCT Blood Glucose.: 264 mg/dL (12 Sep 2022 07:47)                          10.9   7.73  )-----------( 195      ( 12 Sep 2022 22:31 )             33.1       Auto Neutrophil %: 65.8 % (09-12-22 @ 01:24)  Auto Immature Granulocyte %: 0.3 % (09-12-22 @ 01:24)    09-12    136  |  99  |  34<H>  ----------------------------<  291<H>  4.0   |  28  |  1.7<H>      Calcium, Total Serum: 9.3 mg/dL (09-12-22 @ 22:31)      IMAGING:  none    ACCESS/ DEVICES:  [x ] Peripheral IV  [ ] Central Venous Line	[ ] R	[ ] L	[ ] IJ	[ ] Fem	[ ] SC	Placed:   [ ] Arterial Line		[ ] R	[ ] L	[ ] Fem	[ ] Rad	[ ] Ax	Placed:   [ ] PICC:					[ ] Mediport  [ ] Urinary Catheter,  Date Placed:   [ ] Chest tube: [ ] Right, [ ] Left  [ ] PUMA/Marky Drains      
GENERAL SURGERY PROGRESS NOTE    Patient: GAUTAM MEYERS , 72y ([birth year])Male   MRN: [id number]  Location: [place] [address] A  Visit: 09-08-22 Inpatient  Date: 09-11-22 @ 00:59    No acute issues reported. Pain well controlled. Hemodynamically stable, afebrile, voiding spontaneously. Denies nausea, vomiting, fever, or chills. Patient is a disposition issue as he is medically cleared for discharge, will follow up with case management and social work regarding placement.     PAST MEDICAL & SURGICAL HISTORY:  HTN (hypertension)      High cholesterol      DM (diabetes mellitus)      Stage 3 chronic kidney disease      Glaucoma      H/O colonoscopy          Vitals:   T(F): 96.8 (09-11-22 @ 00:53), Max: 98.6 (09-10-22 @ 07:55)  HR: 71 (09-11-22 @ 00:53)  BP: 123/59 (09-11-22 @ 00:53)  RR: 18 (09-11-22 @ 00:53)  SpO2: 97% (09-10-22 @ 16:00)      Diet, Consistent Carbohydrate/No Snacks      Fluids:     I & O's:    09-09-22 @ 07:01  -  09-10-22 @ 07:00  --------------------------------------------------------  IN:    Oral Fluid: 300 mL  Total IN: 300 mL    OUT:    Voided (mL): 350 mL  Total OUT: 350 mL    Total NET: -50 mL        Bowel Movement: : [] YES [] NO  Flatus: : [] YES [] NO    PHYSICAL EXAM:  General: NAD, AAOx3, calm and cooperative  HEENT: NCAT  Cardiac: No peripheral cyanosis or pallor, extremities well perfused   Respiratory: Non-labored breathing, equal chest rise bilaterally   Abdomen: Soft, non-distended, non-tender, no rebound, no guarding  Musculoskeletal: Strength 5/5 BL UE/LE, ROM intact, compartments soft  Neuro: Sensation grossly intact and equal throughout, no focal deficits  Vascular: Pulses 2+ throughout, extremities well perfused  Skin: Warm/dry, normal color, no jaundice  Incision/wound: healing well, dressings in place, clean, dry and intact    MEDICATIONS  (STANDING):  acetaminophen     Tablet .. 650 milliGRAM(s) Oral every 6 hours  amLODIPine   Tablet 2.5 milliGRAM(s) Oral daily  aspirin enteric coated 81 milliGRAM(s) Oral daily  atorvastatin Oral Tab/Cap - Peds 10 milliGRAM(s) Oral daily  dextrose 5%. 1000 milliLiter(s) (50 mL/Hr) IV Continuous <Continuous>  dextrose 5%. 1000 milliLiter(s) (100 mL/Hr) IV Continuous <Continuous>  dextrose 50% Injectable 25 Gram(s) IV Push once  dextrose 50% Injectable 12.5 Gram(s) IV Push once  dextrose 50% Injectable 25 Gram(s) IV Push once  finasteride 5 milliGRAM(s) Oral daily  furosemide    Tablet 40 milliGRAM(s) Oral daily  glucagon  Injectable 1 milliGRAM(s) IntraMuscular once  heparin   Injectable 5000 Unit(s) SubCutaneous every 8 hours  insulin glargine Injectable (LANTUS) 33 Unit(s) SubCutaneous at bedtime  insulin lispro (ADMELOG) corrective regimen sliding scale   SubCutaneous three times a day before meals  losartan 100 milliGRAM(s) Oral daily  metolazone 2.5 milliGRAM(s) Oral daily  metoprolol tartrate 25 milliGRAM(s) Oral two times a day  tamsulosin 0.4 milliGRAM(s) Oral at bedtime    MEDICATIONS  (PRN):  dextrose Oral Gel 15 Gram(s) Oral once PRN Blood Glucose LESS THAN 70 milliGRAM(s)/deciliter  gabapentin 100 milliGRAM(s) Oral three times a day PRN Pain      DVT PROPHYLAXIS: heparin   Injectable 5000 Unit(s) SubCutaneous every 8 hours    GI PROPHYLAXIS:   ANTICOAGULATION:   ANTIBIOTICS:            LAB/STUDIES:  Labs:  CAPILLARY BLOOD GLUCOSE      POCT Blood Glucose.: 327 mg/dL (10 Sep 2022 21:10)  POCT Blood Glucose.: 224 mg/dL (10 Sep 2022 16:21)  POCT Blood Glucose.: 258 mg/dL (10 Sep 2022 11:18)  POCT Blood Glucose.: 172 mg/dL (10 Sep 2022 07:55)                          10.7   8.02  )-----------( 150      ( 09 Sep 2022 07:10 )             31.4         09-09    140  |  103  |  50<H>  ----------------------------<  305<H>  4.6   |  26  |  1.7<H>          LFTs:         Coags:                        IMAGING:      ACCESS/ DEVICES:  [ ] Peripheral IV  [ ] Central Venous Line	[ ] R	[ ] L	[ ] IJ	[ ] Fem	[ ] SC	Placed:   [ ] Arterial Line		[ ] R	[ ] L	[ ] Fem	[ ] Rad	[ ] Ax	Placed:   [ ] PICC:					[ ] Mediport  [ ] Urinary Catheter,  Date Placed:   [ ] Chest tube: [ ] Right, [ ] Left  [ ] PUMA/Marky Drains      ASSESSMENT:  72y M w/ PMHx of  ****    PLAN:  -  -  -    Lines/Tubes: PIV, Midline, Central Line, A-Line, Chest tubes, Marky/PUMA drains, Lewis Catheter.  
VASCULAR SURGERY PROGRESS NOTE      Procedure: s/p excisional debridement of LLE lymphatic mass    Events of past 24 hours: elevated fingersticks, no other acute events      ROS otherwise negative except per subjective and HPI      PAST MEDICAL & SURGICAL HISTORY:  HTN (hypertension)      High cholesterol      DM (diabetes mellitus)      Stage 3 chronic kidney disease      Glaucoma      H/O colonoscopy          Vital Signs Last 24 Hrs  T(C): 36.4 (13 Sep 2022 23:53), Max: 36.4 (13 Sep 2022 23:53)  T(F): 97.5 (13 Sep 2022 23:53), Max: 97.5 (13 Sep 2022 23:53)  HR: 68 (13 Sep 2022 23:53) (67 - 80)  BP: 118/56 (13 Sep 2022 23:53) (104/50 - 118/56)  BP(mean): --  RR: 18 (13 Sep 2022 23:53) (18 - 18)  SpO2: --        Pain (0-10):            Pain Control Adequate: [] YES [] N    Diet:    I&O's Detail    12 Sep 2022 07:01  -  13 Sep 2022 07:00  --------------------------------------------------------  IN:    Oral Fluid: 240 mL  Total IN: 240 mL    OUT:    Voided (mL): 1000 mL  Total OUT: 1000 mL    Total NET: -760 mL        PHYSICAL EXAM  Appearance: Normal  Cardiovascular: RRR. normal s1, s2  Respiratory: no labored breathing, chest rising equally b/l  Gastrointestinal:  Soft, Non-tender, npn-distended  Skin: No rashes, No ecchymoses, No cyanosis  Extremities: Normal range of motion, No clubbing, cyanosis or edema  Incision/wound: healing well, dressings clean/dry/intact      MEDICATIONS:   MEDICATIONS  (STANDING):  acetaminophen     Tablet .. 650 milliGRAM(s) Oral every 6 hours  amLODIPine   Tablet 2.5 milliGRAM(s) Oral daily  aspirin enteric coated 81 milliGRAM(s) Oral daily  atorvastatin Oral Tab/Cap - Peds 10 milliGRAM(s) Oral daily  dextrose 5%. 1000 milliLiter(s) (50 mL/Hr) IV Continuous <Continuous>  dextrose 5%. 1000 milliLiter(s) (100 mL/Hr) IV Continuous <Continuous>  dextrose 50% Injectable 25 Gram(s) IV Push once  dextrose 50% Injectable 12.5 Gram(s) IV Push once  dextrose 50% Injectable 25 Gram(s) IV Push once  finasteride 5 milliGRAM(s) Oral daily  furosemide    Tablet 40 milliGRAM(s) Oral daily  glucagon  Injectable 1 milliGRAM(s) IntraMuscular once  heparin   Injectable 5000 Unit(s) SubCutaneous every 8 hours  insulin glargine Injectable (LANTUS) 33 Unit(s) SubCutaneous at bedtime  insulin lispro (ADMELOG) corrective regimen sliding scale   SubCutaneous three times a day before meals  insulin lispro Injectable (ADMELOG) 12 Unit(s) SubCutaneous three times a day before meals  losartan 100 milliGRAM(s) Oral daily  metolazone 2.5 milliGRAM(s) Oral daily  metoprolol tartrate 25 milliGRAM(s) Oral two times a day  tamsulosin 0.4 milliGRAM(s) Oral at bedtime    MEDICATIONS  (PRN):  dextrose Oral Gel 15 Gram(s) Oral once PRN Blood Glucose LESS THAN 70 milliGRAM(s)/deciliter  gabapentin 100 milliGRAM(s) Oral three times a day PRN Pain      DVT PROPHYLAXIS: [] YES [] NO  GI PROPHYLAXIS: [] YES [] NO  ANTIPLATELETS: [] YES [] NO  ANTICOAGULATION: [] YES [] NO  ANTIBIOTICS: [] YES [] NO    LAB/STUDIES:                        10.9   7.73  )-----------( 195      ( 12 Sep 2022 22:31 )             33.1     09-12    136  |  99  |  34<H>  ----------------------------<  291<H>  4.0   |  28  |  1.7<H>    Ca    9.3      12 Sep 2022 22:31  Phos  2.8     09-12  Mg     2.2     09-12          IMAGING:  
VASCULAR SURGERY PROGRESS NOTE    Patient: GAUTAM MEYERS , 72y ([birth year])Male   MRN: [id number]  Location: [address]  Visit: 09-08-22 Inpatient  Date: 09-12-22 @ 09:20    Procedure/Dx/Injuries: s/p excisional debridement of left lower extremity lymphatic mass    PAST MEDICAL & SURGICAL HISTORY:  HTN (hypertension)  High cholesterol  DM (diabetes mellitus)  Stage 3 chronic kidney disease  Glaucoma  H/O colonoscopy    Vitals:   T(F): 98.1 (09-12-22 @ 08:00), Max: 98.1 (09-12-22 @ 08:00)  HR: 88 (09-12-22 @ 08:00)  BP: 114/58 (09-12-22 @ 08:00)  RR: 18 (09-12-22 @ 08:00)  SpO2: --    Diet, Consistent Carbohydrate/No Snacks    Fluids:     I & O's:    09-11-22 @ 07:01  -  09-12-22 @ 07:00  --------------------------------------------------------  IN:    Oral Fluid: 510 mL  Total IN: 510 mL    OUT:    Voided (mL): 400 mL  Total OUT: 400 mL    Total NET: 110 mL    PHYSICAL EXAM:  General: NAD, AAOx3, calm and cooperative  Cardiac: RRR S1, S2  Respiratory: normal respiratory effort  Abdomen: Soft, non-distended, non-tender  Vascular: Pulses 2+ throughout, extremities well perfused  Skin: Warm/dry, normal color, no jaundice  Incision/wound: healing well, dressings in place, clean, dry and intact    MEDICATIONS  (STANDING):  acetaminophen     Tablet .. 650 milliGRAM(s) Oral every 6 hours  amLODIPine   Tablet 2.5 milliGRAM(s) Oral daily  aspirin enteric coated 81 milliGRAM(s) Oral daily  atorvastatin Oral Tab/Cap - Peds 10 milliGRAM(s) Oral daily  dextrose 5%. 1000 milliLiter(s) (50 mL/Hr) IV Continuous <Continuous>  dextrose 5%. 1000 milliLiter(s) (100 mL/Hr) IV Continuous <Continuous>  dextrose 50% Injectable 25 Gram(s) IV Push once  dextrose 50% Injectable 12.5 Gram(s) IV Push once  dextrose 50% Injectable 25 Gram(s) IV Push once  finasteride 5 milliGRAM(s) Oral daily  furosemide    Tablet 40 milliGRAM(s) Oral daily  glucagon  Injectable 1 milliGRAM(s) IntraMuscular once  heparin   Injectable 5000 Unit(s) SubCutaneous every 8 hours  insulin glargine Injectable (LANTUS) 33 Unit(s) SubCutaneous at bedtime  insulin lispro (ADMELOG) corrective regimen sliding scale   SubCutaneous three times a day before meals  losartan 100 milliGRAM(s) Oral daily  metolazone 2.5 milliGRAM(s) Oral daily  metoprolol tartrate 25 milliGRAM(s) Oral two times a day  tamsulosin 0.4 milliGRAM(s) Oral at bedtime    MEDICATIONS  (PRN):  dextrose Oral Gel 15 Gram(s) Oral once PRN Blood Glucose LESS THAN 70 milliGRAM(s)/deciliter  gabapentin 100 milliGRAM(s) Oral three times a day PRN Pain    DVT PROPHYLAXIS: heparin   Injectable 5000 Unit(s) SubCutaneous every 8 hours    GI PROPHYLAXIS:   ANTICOAGULATION:   ANTIBIOTICS:      LAB/STUDIES:  Labs:  CAPILLARY BLOOD GLUCOSE    POCT Blood Glucose.: 264 mg/dL (12 Sep 2022 07:47)  POCT Blood Glucose.: 313 mg/dL (11 Sep 2022 21:17)  POCT Blood Glucose.: 145 mg/dL (11 Sep 2022 16:36)  POCT Blood Glucose.: 261 mg/dL (11 Sep 2022 11:30)                        10.4   6.43  )-----------( 149      ( 12 Sep 2022 01:24 )             30.2       Auto Neutrophil %: 65.8 % (09-12-22 @ 01:24)  Auto Immature Granulocyte %: 0.3 % (09-12-22 @ 01:24)    09-12    139  |  100  |  32<H>  ----------------------------<  317<H>  3.9   |  28  |  1.5      Calcium, Total Serum: 9.3 mg/dL (09-12-22 @ 01:24)            
VASCULAR SURGERY PROGRESS NOTE    Patient: GAUTAM MEYERS , 72y ([birth year])Male   MRN: [id number]  Location: [place] [address] [facility]  Visit: 09-08-22 Inpatient  Date: 09-09-22 @ 02:44    Hospital Day #: 2  Post-Op Day #: 1    Procedure/Dx/Injuries: excisional debridement of LLE lymphatic mass     Events of past 24 hours: post op serosanguinous oozing, pain controlled, retained urine--> 800cc on bladder scan and was straight cathed    PAST MEDICAL & SURGICAL HISTORY:  HTN (hypertension)  High cholesterol  DM (diabetes mellitus)  Stage 3 chronic kidney disease  Glaucoma  H/O colonoscopy      Vitals:   T(F): 98.5 (09-09-22 @ 00:23), Max: 98.9 (09-08-22 @ 11:29)  HR: 70 (09-09-22 @ 00:23)  BP: 157/72 (09-09-22 @ 00:23)  RR: 18 (09-09-22 @ 00:23)  SpO2: 100% (09-08-22 @ 20:51)      Diet, Consistent Carbohydrate/No Snacks      Fluids:     I & O's:      PHYSICAL EXAM:  General: NAD, AAOx3, calm and cooperative  HEENT: NCAT, JESSE, EOMI, Trachea ML, Neck supple  Cardiac: RRR S1, S2, no Murmurs, rubs or gallops  Respiratory: CTAB, normal respiratory effort, breath sounds equal BL,   Musculoskeletal: Strength 5/5 BL UE/LE, ROM intact, compartments soft  Neuro: Sensation grossly intact and equal throughout, no focal deficits  Vascular: Pulses 2+ throughout, extremities well perfused  Skin: Warm/dry, normal color, no jaundice  Incision/wound: LLE healing well, dressings in place, posterior aspect serosanguinous oozing which has stopped, wrapped in clean ACE    MEDICATIONS  (STANDING):  acetaminophen     Tablet .. 650 milliGRAM(s) Oral every 6 hours  amLODIPine   Tablet 2.5 milliGRAM(s) Oral daily  aspirin enteric coated 81 milliGRAM(s) Oral daily  atorvastatin Oral Tab/Cap - Peds 10 milliGRAM(s) Oral daily  dextrose 5%. 1000 milliLiter(s) (50 mL/Hr) IV Continuous <Continuous>  dextrose 5%. 1000 milliLiter(s) (100 mL/Hr) IV Continuous <Continuous>  dextrose 50% Injectable 25 Gram(s) IV Push once  dextrose 50% Injectable 12.5 Gram(s) IV Push once  dextrose 50% Injectable 25 Gram(s) IV Push once  finasteride 5 milliGRAM(s) Oral daily  furosemide    Tablet 40 milliGRAM(s) Oral daily  glucagon  Injectable 1 milliGRAM(s) IntraMuscular once  insulin glargine Injectable (LANTUS) 33 Unit(s) SubCutaneous at bedtime  insulin lispro (ADMELOG) corrective regimen sliding scale   SubCutaneous three times a day before meals  losartan 100 milliGRAM(s) Oral daily  metolazone 2.5 milliGRAM(s) Oral daily  metoprolol tartrate 25 milliGRAM(s) Oral two times a day  tamsulosin 0.4 milliGRAM(s) Oral at bedtime    MEDICATIONS  (PRN):  dextrose Oral Gel 15 Gram(s) Oral once PRN Blood Glucose LESS THAN 70 milliGRAM(s)/deciliter      DVT PROPHYLAXIS:   GI PROPHYLAXIS:   ANTICOAGULATION:       LAB/STUDIES  CAPILLARY BLOOD GLUCOSE      POCT Blood Glucose.: 278 mg/dL (08 Sep 2022 20:42)  POCT Blood Glucose.: 254 mg/dL (08 Sep 2022 11:36)      ACCESS/ DEVICES:  [X ] Peripheral IV

## 2022-09-23 ENCOUNTER — APPOINTMENT (OUTPATIENT)
Dept: VASCULAR SURGERY | Facility: CLINIC | Age: 72
End: 2022-09-23
Payer: MEDICARE

## 2022-09-23 VITALS — BODY MASS INDEX: 32.95 KG/M2 | HEIGHT: 66 IN | WEIGHT: 205 LBS

## 2022-09-23 VITALS — DIASTOLIC BLOOD PRESSURE: 74 MMHG | SYSTOLIC BLOOD PRESSURE: 131 MMHG

## 2022-09-23 DIAGNOSIS — L97.919 VARICOSE VEINS OF RIGHT LOWER EXTREMITY WITH ULCER OF UNSPECIFIED SITE: ICD-10-CM

## 2022-09-23 DIAGNOSIS — L97.929 VARICOSE VEINS OF RIGHT LOWER EXTREMITY WITH ULCER OF UNSPECIFIED SITE: ICD-10-CM

## 2022-09-23 DIAGNOSIS — I83.029 VARICOSE VEINS OF RIGHT LOWER EXTREMITY WITH ULCER OF UNSPECIFIED SITE: ICD-10-CM

## 2022-09-23 DIAGNOSIS — I83.019 VARICOSE VEINS OF RIGHT LOWER EXTREMITY WITH ULCER OF UNSPECIFIED SITE: ICD-10-CM

## 2022-09-23 PROCEDURE — 29580 STRAPPING UNNA BOOT: CPT | Mod: 50,58

## 2022-09-30 ENCOUNTER — APPOINTMENT (OUTPATIENT)
Dept: VASCULAR SURGERY | Facility: CLINIC | Age: 72
End: 2022-09-30

## 2022-09-30 PROCEDURE — 99024 POSTOP FOLLOW-UP VISIT: CPT

## 2022-10-28 ENCOUNTER — APPOINTMENT (OUTPATIENT)
Dept: VASCULAR SURGERY | Facility: CLINIC | Age: 72
End: 2022-10-28

## 2022-10-28 PROCEDURE — 99024 POSTOP FOLLOW-UP VISIT: CPT

## 2023-04-28 ENCOUNTER — APPOINTMENT (OUTPATIENT)
Dept: VASCULAR SURGERY | Facility: CLINIC | Age: 73
End: 2023-04-28